# Patient Record
Sex: MALE | Race: WHITE | NOT HISPANIC OR LATINO | URBAN - METROPOLITAN AREA
[De-identification: names, ages, dates, MRNs, and addresses within clinical notes are randomized per-mention and may not be internally consistent; named-entity substitution may affect disease eponyms.]

---

## 2015-04-27 RX ORDER — LOSARTAN POTASSIUM 100 MG/1
4 TABLET, FILM COATED ORAL
Qty: 0 | Refills: 0 | COMMUNITY
Start: 2015-04-27

## 2019-04-29 ENCOUNTER — INPATIENT (INPATIENT)
Facility: HOSPITAL | Age: 74
LOS: 9 days | Discharge: NURSING, REHAB OR LONG-TERM CARE | DRG: 374 | End: 2019-05-09
Attending: HOSPITALIST | Admitting: HOSPITALIST
Payer: MEDICARE

## 2019-04-29 VITALS — HEIGHT: 70 IN | HEART RATE: 119 BPM | OXYGEN SATURATION: 95 % | WEIGHT: 190.04 LBS | RESPIRATION RATE: 16 BRPM

## 2019-04-29 DIAGNOSIS — E78.5 HYPERLIPIDEMIA, UNSPECIFIED: ICD-10-CM

## 2019-04-29 DIAGNOSIS — E86.0 DEHYDRATION: ICD-10-CM

## 2019-04-29 DIAGNOSIS — A41.9 SEPSIS, UNSPECIFIED ORGANISM: ICD-10-CM

## 2019-04-29 DIAGNOSIS — N17.9 ACUTE KIDNEY FAILURE, UNSPECIFIED: ICD-10-CM

## 2019-04-29 DIAGNOSIS — Z86.79 PERSONAL HISTORY OF OTHER DISEASES OF THE CIRCULATORY SYSTEM: ICD-10-CM

## 2019-04-29 DIAGNOSIS — E11.9 TYPE 2 DIABETES MELLITUS WITHOUT COMPLICATIONS: ICD-10-CM

## 2019-04-29 DIAGNOSIS — Z29.9 ENCOUNTER FOR PROPHYLACTIC MEASURES, UNSPECIFIED: ICD-10-CM

## 2019-04-29 DIAGNOSIS — E03.9 HYPOTHYROIDISM, UNSPECIFIED: ICD-10-CM

## 2019-04-29 DIAGNOSIS — K22.70 BARRETT'S ESOPHAGUS WITHOUT DYSPLASIA: ICD-10-CM

## 2019-04-29 LAB
ALBUMIN SERPL ELPH-MCNC: 2.5 G/DL — LOW (ref 3.5–5)
ALP SERPL-CCNC: 148 U/L — HIGH (ref 40–120)
ALT FLD-CCNC: 33 U/L DA — SIGNIFICANT CHANGE UP (ref 10–60)
ANION GAP SERPL CALC-SCNC: 10 MMOL/L — SIGNIFICANT CHANGE UP (ref 5–17)
ANION GAP SERPL CALC-SCNC: 12 MMOL/L — SIGNIFICANT CHANGE UP (ref 5–17)
ANION GAP SERPL CALC-SCNC: 19 MMOL/L — HIGH (ref 5–17)
APPEARANCE UR: CLEAR — SIGNIFICANT CHANGE UP
APTT BLD: 30.4 SEC — SIGNIFICANT CHANGE UP (ref 27.5–36.3)
AST SERPL-CCNC: 29 U/L — SIGNIFICANT CHANGE UP (ref 10–40)
BASE EXCESS BLDV CALC-SCNC: -8.5 MMOL/L — LOW (ref -2–2)
BASOPHILS # BLD AUTO: 0 K/UL — SIGNIFICANT CHANGE UP (ref 0–0.2)
BASOPHILS # BLD AUTO: 0.05 K/UL — SIGNIFICANT CHANGE UP (ref 0–0.2)
BASOPHILS # BLD AUTO: 0.07 K/UL — SIGNIFICANT CHANGE UP (ref 0–0.2)
BASOPHILS NFR BLD AUTO: 0 % — SIGNIFICANT CHANGE UP (ref 0–2)
BASOPHILS NFR BLD AUTO: 0.2 % — SIGNIFICANT CHANGE UP (ref 0–2)
BASOPHILS NFR BLD AUTO: 0.3 % — SIGNIFICANT CHANGE UP (ref 0–2)
BILIRUB SERPL-MCNC: 0.4 MG/DL — SIGNIFICANT CHANGE UP (ref 0.2–1.2)
BILIRUB UR-MCNC: NEGATIVE — SIGNIFICANT CHANGE UP
BLOOD GAS COMMENTS, VENOUS: SIGNIFICANT CHANGE UP
BUN SERPL-MCNC: 46 MG/DL — HIGH (ref 7–18)
BUN SERPL-MCNC: 49 MG/DL — HIGH (ref 7–18)
BUN SERPL-MCNC: 52 MG/DL — HIGH (ref 7–18)
BURR CELLS BLD QL SMEAR: PRESENT — SIGNIFICANT CHANGE UP
CALCIUM SERPL-MCNC: 10.9 MG/DL — HIGH (ref 8.4–10.5)
CALCIUM SERPL-MCNC: 8.8 MG/DL — SIGNIFICANT CHANGE UP (ref 8.4–10.5)
CALCIUM SERPL-MCNC: 9.6 MG/DL — SIGNIFICANT CHANGE UP (ref 8.4–10.5)
CHLORIDE SERPL-SCNC: 101 MMOL/L — SIGNIFICANT CHANGE UP (ref 96–108)
CHLORIDE SERPL-SCNC: 107 MMOL/L — SIGNIFICANT CHANGE UP (ref 96–108)
CHLORIDE SERPL-SCNC: 108 MMOL/L — SIGNIFICANT CHANGE UP (ref 96–108)
CHOLEST SERPL-MCNC: 94 MG/DL — SIGNIFICANT CHANGE UP (ref 10–199)
CO2 SERPL-SCNC: 14 MMOL/L — LOW (ref 22–31)
CO2 SERPL-SCNC: 20 MMOL/L — LOW (ref 22–31)
CO2 SERPL-SCNC: 21 MMOL/L — LOW (ref 22–31)
COLOR SPEC: YELLOW — SIGNIFICANT CHANGE UP
CREAT SERPL-MCNC: 2.26 MG/DL — HIGH (ref 0.5–1.3)
CREAT SERPL-MCNC: 2.41 MG/DL — HIGH (ref 0.5–1.3)
CREAT SERPL-MCNC: 3.03 MG/DL — HIGH (ref 0.5–1.3)
DIFF PNL FLD: ABNORMAL
EOSINOPHIL # BLD AUTO: 0 K/UL — SIGNIFICANT CHANGE UP (ref 0–0.5)
EOSINOPHIL # BLD AUTO: 0.06 K/UL — SIGNIFICANT CHANGE UP (ref 0–0.5)
EOSINOPHIL # BLD AUTO: 0.15 K/UL — SIGNIFICANT CHANGE UP (ref 0–0.5)
EOSINOPHIL NFR BLD AUTO: 0 % — SIGNIFICANT CHANGE UP (ref 0–6)
EOSINOPHIL NFR BLD AUTO: 0.2 % — SIGNIFICANT CHANGE UP (ref 0–6)
EOSINOPHIL NFR BLD AUTO: 0.6 % — SIGNIFICANT CHANGE UP (ref 0–6)
FLU A RESULT: SIGNIFICANT CHANGE UP
FLU A RESULT: SIGNIFICANT CHANGE UP
FLUAV AG NPH QL: SIGNIFICANT CHANGE UP
FLUBV AG NPH QL: SIGNIFICANT CHANGE UP
GLUCOSE SERPL-MCNC: 128 MG/DL — HIGH (ref 70–99)
GLUCOSE SERPL-MCNC: 132 MG/DL — HIGH (ref 70–99)
GLUCOSE SERPL-MCNC: 268 MG/DL — HIGH (ref 70–99)
GLUCOSE UR QL: NEGATIVE — SIGNIFICANT CHANGE UP
HCO3 BLDV-SCNC: 16 MMOL/L — LOW (ref 21–29)
HCT VFR BLD CALC: 34.8 % — LOW (ref 39–50)
HCT VFR BLD CALC: 36.9 % — LOW (ref 39–50)
HCT VFR BLD CALC: 42.7 % — SIGNIFICANT CHANGE UP (ref 39–50)
HDLC SERPL-MCNC: 35 MG/DL — LOW
HGB BLD-MCNC: 11.4 G/DL — LOW (ref 13–17)
HGB BLD-MCNC: 12.1 G/DL — LOW (ref 13–17)
HGB BLD-MCNC: 14 G/DL — SIGNIFICANT CHANGE UP (ref 13–17)
HOROWITZ INDEX BLDV+IHG-RTO: 21 — SIGNIFICANT CHANGE UP
IMM GRANULOCYTES NFR BLD AUTO: 0.5 % — SIGNIFICANT CHANGE UP (ref 0–1.5)
IMM GRANULOCYTES NFR BLD AUTO: 0.6 % — SIGNIFICANT CHANGE UP (ref 0–1.5)
INR BLD: 1.11 RATIO — SIGNIFICANT CHANGE UP (ref 0.88–1.16)
KETONES UR-MCNC: NEGATIVE — SIGNIFICANT CHANGE UP
LACTATE SERPL-SCNC: 10.3 MMOL/L — CRITICAL HIGH (ref 0.7–2)
LACTATE SERPL-SCNC: 3.2 MMOL/L — HIGH (ref 0.7–2)
LACTATE SERPL-SCNC: 3.2 MMOL/L — HIGH (ref 0.7–2)
LEUKOCYTE ESTERASE UR-ACNC: NEGATIVE — SIGNIFICANT CHANGE UP
LIPID PNL WITH DIRECT LDL SERPL: 25 MG/DL — SIGNIFICANT CHANGE UP
LYMPHOCYTES # BLD AUTO: 1.32 K/UL — SIGNIFICANT CHANGE UP (ref 1–3.3)
LYMPHOCYTES # BLD AUTO: 2.02 K/UL — SIGNIFICANT CHANGE UP (ref 1–3.3)
LYMPHOCYTES # BLD AUTO: 2.19 K/UL — SIGNIFICANT CHANGE UP (ref 1–3.3)
LYMPHOCYTES # BLD AUTO: 4.8 % — LOW (ref 13–44)
LYMPHOCYTES # BLD AUTO: 7 % — LOW (ref 13–44)
LYMPHOCYTES # BLD AUTO: 9.4 % — LOW (ref 13–44)
MAGNESIUM SERPL-MCNC: 1.9 MG/DL — SIGNIFICANT CHANGE UP (ref 1.6–2.6)
MANUAL SMEAR VERIFICATION: SIGNIFICANT CHANGE UP
MCHC RBC-ENTMCNC: 30.1 PG — SIGNIFICANT CHANGE UP (ref 27–34)
MCHC RBC-ENTMCNC: 30.2 PG — SIGNIFICANT CHANGE UP (ref 27–34)
MCHC RBC-ENTMCNC: 30.2 PG — SIGNIFICANT CHANGE UP (ref 27–34)
MCHC RBC-ENTMCNC: 32.8 GM/DL — SIGNIFICANT CHANGE UP (ref 32–36)
MCV RBC AUTO: 91.8 FL — SIGNIFICANT CHANGE UP (ref 80–100)
MCV RBC AUTO: 92 FL — SIGNIFICANT CHANGE UP (ref 80–100)
MCV RBC AUTO: 92.2 FL — SIGNIFICANT CHANGE UP (ref 80–100)
MONOCYTES # BLD AUTO: 1.63 K/UL — HIGH (ref 0–0.9)
MONOCYTES # BLD AUTO: 1.73 K/UL — HIGH (ref 0–0.9)
MONOCYTES # BLD AUTO: 2.09 K/UL — HIGH (ref 0–0.9)
MONOCYTES NFR BLD AUTO: 6 % — SIGNIFICANT CHANGE UP (ref 2–14)
MONOCYTES NFR BLD AUTO: 7 % — SIGNIFICANT CHANGE UP (ref 2–14)
MONOCYTES NFR BLD AUTO: 7.5 % — SIGNIFICANT CHANGE UP (ref 2–14)
NEUTROPHILS # BLD AUTO: 19.11 K/UL — HIGH (ref 1.8–7.4)
NEUTROPHILS # BLD AUTO: 24.07 K/UL — HIGH (ref 1.8–7.4)
NEUTROPHILS # BLD AUTO: 24.82 K/UL — HIGH (ref 1.8–7.4)
NEUTROPHILS NFR BLD AUTO: 79 % — HIGH (ref 43–77)
NEUTROPHILS NFR BLD AUTO: 82.2 % — HIGH (ref 43–77)
NEUTROPHILS NFR BLD AUTO: 86.7 % — HIGH (ref 43–77)
NEUTS BAND # BLD: 7 % — SIGNIFICANT CHANGE UP (ref 0–8)
NITRITE UR-MCNC: NEGATIVE — SIGNIFICANT CHANGE UP
NRBC # BLD: 0 /100 WBCS — SIGNIFICANT CHANGE UP (ref 0–0)
NRBC # BLD: 0 /100 WBCS — SIGNIFICANT CHANGE UP (ref 0–0)
NRBC # BLD: 0 /100 — SIGNIFICANT CHANGE UP (ref 0–0)
NT-PROBNP SERPL-SCNC: 575 PG/ML — HIGH (ref 0–125)
PCO2 BLDV: 32 MMHG — LOW (ref 35–50)
PH BLDV: 7.32 — LOW (ref 7.35–7.45)
PH UR: 5 — SIGNIFICANT CHANGE UP (ref 5–8)
PHOSPHATE SERPL-MCNC: 2.8 MG/DL — SIGNIFICANT CHANGE UP (ref 2.5–4.5)
PLAT MORPH BLD: NORMAL — SIGNIFICANT CHANGE UP
PLATELET # BLD AUTO: 304 K/UL — SIGNIFICANT CHANGE UP (ref 150–400)
PLATELET # BLD AUTO: 342 K/UL — SIGNIFICANT CHANGE UP (ref 150–400)
PLATELET # BLD AUTO: 458 K/UL — HIGH (ref 150–400)
PO2 BLDV: 29 MMHG — SIGNIFICANT CHANGE UP (ref 25–45)
POIKILOCYTOSIS BLD QL AUTO: SLIGHT — SIGNIFICANT CHANGE UP
POTASSIUM SERPL-MCNC: 3.4 MMOL/L — LOW (ref 3.5–5.3)
POTASSIUM SERPL-MCNC: 3.8 MMOL/L — SIGNIFICANT CHANGE UP (ref 3.5–5.3)
POTASSIUM SERPL-MCNC: 4.2 MMOL/L — SIGNIFICANT CHANGE UP (ref 3.5–5.3)
POTASSIUM SERPL-SCNC: 3.4 MMOL/L — LOW (ref 3.5–5.3)
POTASSIUM SERPL-SCNC: 3.8 MMOL/L — SIGNIFICANT CHANGE UP (ref 3.5–5.3)
POTASSIUM SERPL-SCNC: 4.2 MMOL/L — SIGNIFICANT CHANGE UP (ref 3.5–5.3)
PROT SERPL-MCNC: 8.2 G/DL — SIGNIFICANT CHANGE UP (ref 6–8.3)
PROT UR-MCNC: 100
PROTHROM AB SERPL-ACNC: 12.4 SEC — SIGNIFICANT CHANGE UP (ref 10–12.9)
RBC # BLD: 3.79 M/UL — LOW (ref 4.2–5.8)
RBC # BLD: 4.01 M/UL — LOW (ref 4.2–5.8)
RBC # BLD: 4.63 M/UL — SIGNIFICANT CHANGE UP (ref 4.2–5.8)
RBC # FLD: 13 % — SIGNIFICANT CHANGE UP (ref 10.3–14.5)
RBC # FLD: 13.1 % — SIGNIFICANT CHANGE UP (ref 10.3–14.5)
RBC # FLD: 13.2 % — SIGNIFICANT CHANGE UP (ref 10.3–14.5)
RBC BLD AUTO: ABNORMAL
RSV RESULT: SIGNIFICANT CHANGE UP
RSV RNA RESP QL NAA+PROBE: SIGNIFICANT CHANGE UP
SAO2 % BLDV: 38 % — LOW (ref 67–88)
SODIUM SERPL-SCNC: 134 MMOL/L — LOW (ref 135–145)
SODIUM SERPL-SCNC: 139 MMOL/L — SIGNIFICANT CHANGE UP (ref 135–145)
SODIUM SERPL-SCNC: 139 MMOL/L — SIGNIFICANT CHANGE UP (ref 135–145)
SP GR SPEC: 1.01 — SIGNIFICANT CHANGE UP (ref 1.01–1.02)
TOTAL CHOLESTEROL/HDL RATIO MEASUREMENT: 2.7 RATIO — LOW (ref 3.4–9.6)
TRIGL SERPL-MCNC: 171 MG/DL — HIGH (ref 10–149)
UROBILINOGEN FLD QL: NEGATIVE — SIGNIFICANT CHANGE UP
VARIANT LYMPHS # BLD: 1 % — SIGNIFICANT CHANGE UP (ref 0–6)
WBC # BLD: 23.27 K/UL — HIGH (ref 3.8–10.5)
WBC # BLD: 27.77 K/UL — HIGH (ref 3.8–10.5)
WBC # BLD: 28.86 K/UL — HIGH (ref 3.8–10.5)
WBC # FLD AUTO: 23.27 K/UL — HIGH (ref 3.8–10.5)
WBC # FLD AUTO: 27.77 K/UL — HIGH (ref 3.8–10.5)
WBC # FLD AUTO: 28.86 K/UL — HIGH (ref 3.8–10.5)

## 2019-04-29 PROCEDURE — 93010 ELECTROCARDIOGRAM REPORT: CPT

## 2019-04-29 PROCEDURE — 71045 X-RAY EXAM CHEST 1 VIEW: CPT | Mod: 26

## 2019-04-29 PROCEDURE — 99285 EMERGENCY DEPT VISIT HI MDM: CPT

## 2019-04-29 RX ORDER — SODIUM CHLORIDE 9 MG/ML
1000 INJECTION, SOLUTION INTRAVENOUS ONCE
Qty: 0 | Refills: 0 | Status: COMPLETED | OUTPATIENT
Start: 2019-04-29 | End: 2019-04-29

## 2019-04-29 RX ORDER — TIOTROPIUM BROMIDE 18 UG/1
1 CAPSULE ORAL; RESPIRATORY (INHALATION) DAILY
Qty: 0 | Refills: 0 | Status: COMPLETED | OUTPATIENT
Start: 2019-04-29 | End: 2020-03-27

## 2019-04-29 RX ORDER — INFLUENZA VIRUS VACCINE 15; 15; 15; 15 UG/.5ML; UG/.5ML; UG/.5ML; UG/.5ML
0.5 SUSPENSION INTRAMUSCULAR ONCE
Qty: 0 | Refills: 0 | Status: COMPLETED | OUTPATIENT
Start: 2019-04-29 | End: 2019-04-29

## 2019-04-29 RX ORDER — ATORVASTATIN CALCIUM 80 MG/1
1 TABLET, FILM COATED ORAL
Qty: 0 | Refills: 0 | COMMUNITY

## 2019-04-29 RX ORDER — OLANZAPINE 15 MG/1
5 TABLET, FILM COATED ORAL DAILY
Qty: 0 | Refills: 0 | Status: DISCONTINUED | OUTPATIENT
Start: 2019-04-29 | End: 2019-05-09

## 2019-04-29 RX ORDER — DEXTROSE 50 % IN WATER 50 %
15 SYRINGE (ML) INTRAVENOUS ONCE
Qty: 0 | Refills: 0 | Status: DISCONTINUED | OUTPATIENT
Start: 2019-04-29 | End: 2019-05-06

## 2019-04-29 RX ORDER — HEPARIN SODIUM 5000 [USP'U]/ML
5000 INJECTION INTRAVENOUS; SUBCUTANEOUS EVERY 8 HOURS
Qty: 0 | Refills: 0 | Status: DISCONTINUED | OUTPATIENT
Start: 2019-04-29 | End: 2019-04-29

## 2019-04-29 RX ORDER — SODIUM CHLORIDE 9 MG/ML
2000 INJECTION INTRAMUSCULAR; INTRAVENOUS; SUBCUTANEOUS ONCE
Qty: 0 | Refills: 0 | Status: COMPLETED | OUTPATIENT
Start: 2019-04-29 | End: 2019-04-29

## 2019-04-29 RX ORDER — SODIUM CHLORIDE 9 MG/ML
1000 INJECTION INTRAMUSCULAR; INTRAVENOUS; SUBCUTANEOUS
Qty: 0 | Refills: 0 | Status: DISCONTINUED | OUTPATIENT
Start: 2019-04-29 | End: 2019-04-30

## 2019-04-29 RX ORDER — AMLODIPINE BESYLATE 2.5 MG/1
1 TABLET ORAL
Qty: 0 | Refills: 0 | COMMUNITY

## 2019-04-29 RX ORDER — DEXTROSE 50 % IN WATER 50 %
12.5 SYRINGE (ML) INTRAVENOUS ONCE
Qty: 0 | Refills: 0 | Status: DISCONTINUED | OUTPATIENT
Start: 2019-04-29 | End: 2019-05-06

## 2019-04-29 RX ORDER — LEVOTHYROXINE SODIUM 125 MCG
1 TABLET ORAL
Qty: 0 | Refills: 0 | COMMUNITY

## 2019-04-29 RX ORDER — LEVOTHYROXINE SODIUM 125 MCG
75 TABLET ORAL DAILY
Qty: 0 | Refills: 0 | Status: DISCONTINUED | OUTPATIENT
Start: 2019-04-29 | End: 2019-05-09

## 2019-04-29 RX ORDER — IPRATROPIUM/ALBUTEROL SULFATE 18-103MCG
3 AEROSOL WITH ADAPTER (GRAM) INHALATION ONCE
Qty: 0 | Refills: 0 | Status: COMPLETED | OUTPATIENT
Start: 2019-04-29 | End: 2019-04-29

## 2019-04-29 RX ORDER — OLANZAPINE 15 MG/1
1 TABLET, FILM COATED ORAL
Qty: 0 | Refills: 0 | COMMUNITY

## 2019-04-29 RX ORDER — CHLORHEXIDINE GLUCONATE 213 G/1000ML
1 SOLUTION TOPICAL EVERY 12 HOURS
Qty: 0 | Refills: 0 | Status: DISCONTINUED | OUTPATIENT
Start: 2019-04-29 | End: 2019-05-03

## 2019-04-29 RX ORDER — SITAGLIPTIN 50 MG/1
1 TABLET, FILM COATED ORAL
Qty: 0 | Refills: 0 | COMMUNITY

## 2019-04-29 RX ORDER — GLUCAGON INJECTION, SOLUTION 0.5 MG/.1ML
1 INJECTION, SOLUTION SUBCUTANEOUS ONCE
Qty: 0 | Refills: 0 | Status: DISCONTINUED | OUTPATIENT
Start: 2019-04-29 | End: 2019-05-09

## 2019-04-29 RX ORDER — SODIUM CHLORIDE 9 MG/ML
1000 INJECTION, SOLUTION INTRAVENOUS
Qty: 0 | Refills: 0 | Status: DISCONTINUED | OUTPATIENT
Start: 2019-04-29 | End: 2019-05-09

## 2019-04-29 RX ORDER — CEFTRIAXONE 500 MG/1
1 INJECTION, POWDER, FOR SOLUTION INTRAMUSCULAR; INTRAVENOUS EVERY 24 HOURS
Qty: 0 | Refills: 0 | Status: DISCONTINUED | OUTPATIENT
Start: 2019-04-29 | End: 2019-05-02

## 2019-04-29 RX ORDER — DEXTROSE 50 % IN WATER 50 %
25 SYRINGE (ML) INTRAVENOUS ONCE
Qty: 0 | Refills: 0 | Status: DISCONTINUED | OUTPATIENT
Start: 2019-04-29 | End: 2019-05-06

## 2019-04-29 RX ORDER — SODIUM CHLORIDE 9 MG/ML
2700 INJECTION INTRAMUSCULAR; INTRAVENOUS; SUBCUTANEOUS ONCE
Qty: 0 | Refills: 0 | Status: COMPLETED | OUTPATIENT
Start: 2019-04-29 | End: 2019-04-29

## 2019-04-29 RX ORDER — IPRATROPIUM/ALBUTEROL SULFATE 18-103MCG
3 AEROSOL WITH ADAPTER (GRAM) INHALATION EVERY 6 HOURS
Qty: 0 | Refills: 0 | Status: DISCONTINUED | OUTPATIENT
Start: 2019-04-29 | End: 2019-05-07

## 2019-04-29 RX ORDER — VANCOMYCIN HCL 1 G
1000 VIAL (EA) INTRAVENOUS ONCE
Qty: 0 | Refills: 0 | Status: COMPLETED | OUTPATIENT
Start: 2019-04-29 | End: 2019-04-29

## 2019-04-29 RX ORDER — AZTREONAM 2 G
1000 VIAL (EA) INJECTION ONCE
Qty: 0 | Refills: 0 | Status: COMPLETED | OUTPATIENT
Start: 2019-04-29 | End: 2019-04-29

## 2019-04-29 RX ORDER — HEPARIN SODIUM 5000 [USP'U]/ML
5000 INJECTION INTRAVENOUS; SUBCUTANEOUS EVERY 12 HOURS
Qty: 0 | Refills: 0 | Status: DISCONTINUED | OUTPATIENT
Start: 2019-04-29 | End: 2019-05-09

## 2019-04-29 RX ORDER — AZITHROMYCIN 500 MG/1
500 TABLET, FILM COATED ORAL EVERY 24 HOURS
Qty: 0 | Refills: 0 | Status: DISCONTINUED | OUTPATIENT
Start: 2019-04-29 | End: 2019-05-02

## 2019-04-29 RX ORDER — INSULIN LISPRO 100/ML
VIAL (ML) SUBCUTANEOUS
Qty: 0 | Refills: 0 | Status: DISCONTINUED | OUTPATIENT
Start: 2019-04-29 | End: 2019-05-09

## 2019-04-29 RX ORDER — ATORVASTATIN CALCIUM 80 MG/1
40 TABLET, FILM COATED ORAL AT BEDTIME
Qty: 0 | Refills: 0 | Status: DISCONTINUED | OUTPATIENT
Start: 2019-04-29 | End: 2019-05-09

## 2019-04-29 RX ORDER — ALBUTEROL 90 UG/1
1 AEROSOL, METERED ORAL EVERY 4 HOURS
Qty: 0 | Refills: 0 | Status: DISCONTINUED | OUTPATIENT
Start: 2019-04-29 | End: 2019-04-29

## 2019-04-29 RX ADMIN — Medication 250 MILLIGRAM(S): at 15:45

## 2019-04-29 RX ADMIN — SODIUM CHLORIDE 2700 MILLILITER(S): 9 INJECTION INTRAMUSCULAR; INTRAVENOUS; SUBCUTANEOUS at 14:00

## 2019-04-29 RX ADMIN — Medication 1: at 17:41

## 2019-04-29 RX ADMIN — ATORVASTATIN CALCIUM 40 MILLIGRAM(S): 80 TABLET, FILM COATED ORAL at 21:57

## 2019-04-29 RX ADMIN — SODIUM CHLORIDE 125 MILLILITER(S): 9 INJECTION INTRAMUSCULAR; INTRAVENOUS; SUBCUTANEOUS at 16:00

## 2019-04-29 RX ADMIN — Medication 3 MILLILITER(S): at 15:44

## 2019-04-29 RX ADMIN — SODIUM CHLORIDE 4000 MILLILITER(S): 9 INJECTION INTRAMUSCULAR; INTRAVENOUS; SUBCUTANEOUS at 16:01

## 2019-04-29 RX ADMIN — Medication 50 MILLIGRAM(S): at 14:09

## 2019-04-29 RX ADMIN — CHLORHEXIDINE GLUCONATE 1 APPLICATION(S): 213 SOLUTION TOPICAL at 21:56

## 2019-04-29 RX ADMIN — SODIUM CHLORIDE 125 MILLILITER(S): 9 INJECTION INTRAMUSCULAR; INTRAVENOUS; SUBCUTANEOUS at 15:59

## 2019-04-29 RX ADMIN — HEPARIN SODIUM 5000 UNIT(S): 5000 INJECTION INTRAVENOUS; SUBCUTANEOUS at 17:41

## 2019-04-29 RX ADMIN — OLANZAPINE 5 MILLIGRAM(S): 15 TABLET, FILM COATED ORAL at 21:56

## 2019-04-29 RX ADMIN — AZITHROMYCIN 250 MILLIGRAM(S): 500 TABLET, FILM COATED ORAL at 17:41

## 2019-04-29 RX ADMIN — Medication 3 MILLILITER(S): at 20:22

## 2019-04-29 RX ADMIN — Medication 1000 MILLIGRAM(S): at 15:45

## 2019-04-29 RX ADMIN — SODIUM CHLORIDE 2700 MILLILITER(S): 9 INJECTION INTRAMUSCULAR; INTRAVENOUS; SUBCUTANEOUS at 12:56

## 2019-04-29 RX ADMIN — CEFTRIAXONE 100 GRAM(S): 500 INJECTION, POWDER, FOR SOLUTION INTRAMUSCULAR; INTRAVENOUS at 21:56

## 2019-04-29 RX ADMIN — SODIUM CHLORIDE 1000 MILLILITER(S): 9 INJECTION, SOLUTION INTRAVENOUS at 21:56

## 2019-04-29 NOTE — H&P ADULT - PROBLEM SELECTOR PLAN 1
T- 99, HR- 101, BP- 105/49, lactate- 10.3, leukocytosis- 28  Sepsis likely 2/2 to bronchitis?  UA pending  EKG- sinus tachy  CXR- no consolidation seen, but patient is very congested and has cough  s/p 2700 ml NS bolus + aztronam and vanc x 1 in ED  - f/u blood cx  - f/u UA and urine cx  - WIll start CTX and azithromycin prophylactically  - duoneb q6hr  - f/u procalcitonin  - trend lactate q4hr  - f/u serum strep pneumo  - f/u urine legionella  - f/u flu swab

## 2019-04-29 NOTE — H&P ADULT - PROBLEM SELECTOR PLAN 2
likely prerenal 2/2 to hypovolemia vs drug induced (on losartan, HCTz, and metformin ER)  - hold off to the medications  - c/w IVF  - Monitor closely

## 2019-04-29 NOTE — H&P ADULT - HISTORY OF PRESENT ILLNESS
73 y/o M with PMHx of brown's esophagus, HTN, DM, HLD, and hypothyroidism came in due to weakness. Patient states that he always had difficulty swallowing solids but since 1 week it has been getting worse. He has not been eating or drinking much since then and therefore feels extremely weak. Also states having cough productive of orange sputum since 2-3 days. No fever. No sick contacts. No SOB. Today, he went to his PMD for regular check up and was sent to ER because he "looked sick". He states feeling better now but is still very thirsty. Denies any other complaints including abdominal pain, nausea, vomiting, dysuria, hematuria, or changes in urine output.    SH: lives with this wife. Non smoker. Non alcoholic.

## 2019-04-29 NOTE — H&P ADULT - ASSESSMENT
73 y/o M with PMHx of brown's esophagus, HTN, DM, HLD, and hypothyroidism came in due to weakness. Patient states that he always had difficulty swallowing solids but since 1 week it has been getting worse. He has not been eating or drinking much since then and therefore feels extremely weak. Also states having cough productive of orange sputum since 2-3 days. No fever. No sick contacts. No SOB. Today, he went to his PMD for regular check up and was sent to ER because he "looked sick". He states feeling better now but is still very thirsty. Denies any other complaints including abdominal pain, nausea, vomiting, dysuria, hematuria, or changes in urine output. ICU consulted for sepsis.

## 2019-04-29 NOTE — ED PROVIDER NOTE - OBJECTIVE STATEMENT
75 y/o M with a PMHx of Mcneill esophagus, Diabetes, Hypertension , HLD, Hypokalemia and no PSHx presents to ED c/o wife went on vacation, unable to take care of himself. Reports has not eaten and has been experiencing weakness/diarrhea. Endorses mild depression. Denies cough, although presents with cough. Drug Allergy: Penicillin.

## 2019-04-29 NOTE — H&P ADULT - PROBLEM SELECTOR PLAN 9
[] Previous VTE                                                3  [] Thrombophilia                                             2  [] Lower limb paralysis                                   2    [] Current Cancer                                             2   [X] Immobilization > 24 hrs                              1  [X] ICU/CCU stay > 24 hours                             1  [X] Age > 60                                                         1    IMPROVE VTE Score: 3  heparin

## 2019-04-29 NOTE — H&P ADULT - PROBLEM SELECTOR PLAN 5
On losartan, HCTz, and amlodipine at home   - Will hold off all antihypertensives due to borderline BP. Restart as needed

## 2019-04-29 NOTE — H&P ADULT - PROBLEM SELECTOR PLAN 3
Patient very dehydrated on exam, likely 2/2 to decreased oral intake  s/p 2700 ml of NS  - Will give additional 2L NS bolus followed by maintenance fluids  - trend lactate  - Monitor urine output

## 2019-04-29 NOTE — ED ADULT NURSE REASSESSMENT NOTE - NS ED NURSE REASSESS COMMENT FT1
Pt is coughing at times, states feeling well. Iv fluid infusing. NSR, vitals stable, afebrile. Pt transferred to ICU.

## 2019-04-29 NOTE — H&P ADULT - NSICDXPASTMEDICALHX_GEN_ALL_CORE_FT
PAST MEDICAL HISTORY:  Mcneill esophagus     Diabetes     History of hypertension     HLD (hyperlipidemia)     Hypokalemia

## 2019-04-29 NOTE — H&P ADULT - ATTENDING COMMENTS
Patient seen and examined with resident, Addendum to above.    73 y/o M with PMHx of brown's esophagus, HTN, DM, HLD, and hypothyroidism presenting with generalized malaise and poor oral intake. Does not endorse any specific complaints except for poor oral intake. No nausea, vomiting, or diarrhea. No abdominal pain. On exam appears dehydrated with poor skin turgor. In the ED found to have lactic acidosis, tachycardia and leukocytosis, ruling out infection/sepsis.     A provider-focused exam of reperfusion assessment was completed after fluid bolus.     T(C): 36.4 (04-29-19 @ 15:54), Max: 37.6 (04-29-19 @ 11:44)  HR: 83 (04-29-19 @ 15:54) (83 - 119)  BP: 113/53 (04-29-19 @ 15:54) (105/49 - 113/53)  RR: 17 (04-29-19 @ 15:54) (16 - 17)  SpO2: 94% (04-29-19 @ 15:54) (94% - 96%)    Resp: clear to auscultation, no rales, wheezing, or rhonchi  Cardiac: S1, S2, no murmurs appreciated  Capillary refill time is less than 2 seconds.   Peripheral pulses are + 2 Radial bilaterally and + 2 DP bilaterally  Skin: normal turgor    Assessment:  1. Sepsis - suspect infection though no obvious source, ?Bronchitis. Will cover empirically with antibiotics. F/u cultures. UA pending. CXR unremarkable. R/o FLU.   2. Acute renal failure - unclear baseline. Appears severely dehydrated, possibly ATN due to sepsis. Monitor urine output. Check urine studies including electrolytes and UA. Check US of the kidneys  3. Lactic acidosis - possibly due to dehydration. Vs. due to metformin toxicity as patient with acute renal failure. Trend lactate, if not improving may need HD.   4. Diabetes - monitor fingerstick coverage with insulin.   5. Hypothyroidism - cont. Levothyroxine    - Admit patient to ICU.   - DVT prophylaxis.

## 2019-04-29 NOTE — H&P ADULT - NSHPREVIEWOFSYSTEMS_GEN_ALL_CORE
REVIEW OF SYSTEMS:  CONSTITUTIONAL: fatigue  EYES: No eye pain, visual disturbances, or discharge  ENMT:  No difficulty hearing, tinnitus, vertigo; No sinus or throat pain  NECK: No pain or stiffness  BREASTS: No pain, masses, or nipple discharge  RESPIRATORY: cough, No shortness of breath  CARDIOVASCULAR: No chest pain, palpitations, dizziness, or leg swelling  GASTROINTESTINAL: No abdominal or epigastric pain. No nausea, vomiting, or hematemesis; No diarrhea or constipation. No melena or hematochezia.  GENITOURINARY: No dysuria, frequency, hematuria, or incontinence  NEUROLOGICAL: No headaches, memory loss, loss of strength, numbness, or tremors  SKIN: No itching, burning, rashes, or lesions   LYMPH NODES: No enlarged glands  ENDOCRINE: No heat or cold intolerance; No hair loss  MUSCULOSKELETAL: No joint pain or swelling; No muscle, back, or extremity pain REVIEW OF SYSTEMS:  CONSTITUTIONAL: fatigue, generalized weakness   EYES: No eye pain, visual disturbances, or discharge  ENMT:  No difficulty hearing, tinnitus, vertigo; No sinus or throat pain  NECK: No pain or stiffness  BREASTS: No pain, masses, or nipple discharge  RESPIRATORY: cough, No shortness of breath  CARDIOVASCULAR: No chest pain, palpitations, dizziness, or leg swelling  GASTROINTESTINAL: No abdominal or epigastric pain. No nausea, vomiting, or hematemesis; No diarrhea or constipation. No melena or hematochezia.  GENITOURINARY: No dysuria, frequency, hematuria, or incontinence  NEUROLOGICAL: No headaches, memory loss, loss of strength, numbness, or tremors  SKIN: No itching, burning, rashes, or lesions   LYMPH NODES: No enlarged glands  ENDOCRINE: No heat or cold intolerance; No hair loss  MUSCULOSKELETAL: No joint pain or swelling; No muscle, back, or extremity pain

## 2019-04-29 NOTE — H&P ADULT - PROBLEM SELECTOR PLAN 8
dx 3 years ago via EGD  worsening difficulty to swallowing solids  - Start puree diet until speech swallow eval   - GI- Dr. Saldana

## 2019-04-29 NOTE — ED ADULT NURSE NOTE - OBJECTIVE STATEMENT
Pt came in after having uncontrolled diarrhea at the doctor's office. Pt states not having an appetite for the past month and feeling depressed because his spouse is away.

## 2019-04-29 NOTE — PATIENT PROFILE ADULT - BRADEN SCORE
Faxed completed [employer] form for coverage for Herceptin Fax: [phone] and to Ese Ballard Fax: [phone] as requested to follow progress of [employer] approving Herceptin. [employer] application given to Nupur Garcia to continue providing meals to patient. Shobha Alanis RN, BSN     16

## 2019-04-29 NOTE — H&P ADULT - NSHPPHYSICALEXAM_GEN_ALL_CORE
Vital Signs Last 24 Hrs  T(C): 36.4 (29 Apr 2019 15:54), Max: 37.6 (29 Apr 2019 11:44)  T(F): 97.5 (29 Apr 2019 15:54), Max: 99.6 (29 Apr 2019 11:44)  HR: 83 (29 Apr 2019 15:54) (83 - 119)  BP: 113/53 (29 Apr 2019 15:54) (105/49 - 113/53)  BP(mean): --  RR: 17 (29 Apr 2019 15:54) (16 - 17)  SpO2: 94% (29 Apr 2019 15:54) (94% - 96%)    GENERAL: NAD  HEAD:  Atraumatic, Normocephalic  EYES: EOMI, PERRLA, conjunctiva and sclera clear  ENMT: dry  mucous membranes  NECK: Supple  NERVOUS SYSTEM:  Alert & Oriented X3  CHEST/LUNG: very congested, no rales, rhonchi or wheezing  HEART: Regular rate and rhythm; No murmurs, rubs, or gallops  ABDOMEN: Soft, Nontender, Nondistended; Bowel sounds present  EXTREMITIES:  2+ Peripheral Pulses, No clubbing, cyanosis, or edema  LYMPH: No lymphadenopathy noted  SKIN: dry skin

## 2019-04-30 DIAGNOSIS — R33.9 RETENTION OF URINE, UNSPECIFIED: ICD-10-CM

## 2019-04-30 LAB
ANION GAP SERPL CALC-SCNC: 10 MMOL/L — SIGNIFICANT CHANGE UP (ref 5–17)
ANION GAP SERPL CALC-SCNC: 8 MMOL/L — SIGNIFICANT CHANGE UP (ref 5–17)
ANION GAP SERPL CALC-SCNC: 8 MMOL/L — SIGNIFICANT CHANGE UP (ref 5–17)
BASOPHILS # BLD AUTO: 0.04 K/UL — SIGNIFICANT CHANGE UP (ref 0–0.2)
BASOPHILS NFR BLD AUTO: 0.2 % — SIGNIFICANT CHANGE UP (ref 0–2)
BUN SERPL-MCNC: 25 MG/DL — HIGH (ref 7–18)
BUN SERPL-MCNC: 30 MG/DL — HIGH (ref 7–18)
BUN SERPL-MCNC: 38 MG/DL — HIGH (ref 7–18)
CALCIUM SERPL-MCNC: 8.7 MG/DL — SIGNIFICANT CHANGE UP (ref 8.4–10.5)
CALCIUM SERPL-MCNC: 8.9 MG/DL — SIGNIFICANT CHANGE UP (ref 8.4–10.5)
CALCIUM SERPL-MCNC: 9 MG/DL — SIGNIFICANT CHANGE UP (ref 8.4–10.5)
CALCIUM UR-MCNC: 5.1 MG/DL — SIGNIFICANT CHANGE UP
CHLORIDE SERPL-SCNC: 109 MMOL/L — HIGH (ref 96–108)
CHLORIDE SERPL-SCNC: 111 MMOL/L — HIGH (ref 96–108)
CHLORIDE SERPL-SCNC: 112 MMOL/L — HIGH (ref 96–108)
CHLORIDE UR-SCNC: 126 MMOL/L — HIGH (ref 55–125)
CO2 SERPL-SCNC: 18 MMOL/L — LOW (ref 22–31)
CO2 SERPL-SCNC: 19 MMOL/L — LOW (ref 22–31)
CO2 SERPL-SCNC: 20 MMOL/L — LOW (ref 22–31)
CREAT ?TM UR-MCNC: 56 MG/DL — SIGNIFICANT CHANGE UP
CREAT SERPL-MCNC: 1.61 MG/DL — HIGH (ref 0.5–1.3)
CREAT SERPL-MCNC: 1.71 MG/DL — HIGH (ref 0.5–1.3)
CREAT SERPL-MCNC: 2.04 MG/DL — HIGH (ref 0.5–1.3)
EOSINOPHIL # BLD AUTO: 0.23 K/UL — SIGNIFICANT CHANGE UP (ref 0–0.5)
EOSINOPHIL NFR BLD AUTO: 1.2 % — SIGNIFICANT CHANGE UP (ref 0–6)
GLUCOSE SERPL-MCNC: 145 MG/DL — HIGH (ref 70–99)
GLUCOSE SERPL-MCNC: 153 MG/DL — HIGH (ref 70–99)
GLUCOSE SERPL-MCNC: 159 MG/DL — HIGH (ref 70–99)
HBA1C BLD-MCNC: 7.2 % — HIGH (ref 4–5.6)
HCT VFR BLD CALC: 34.7 % — LOW (ref 39–50)
HGB BLD-MCNC: 11.2 G/DL — LOW (ref 13–17)
IMM GRANULOCYTES NFR BLD AUTO: 0.8 % — SIGNIFICANT CHANGE UP (ref 0–1.5)
LACTATE SERPL-SCNC: 1.6 MMOL/L — SIGNIFICANT CHANGE UP (ref 0.7–2)
LACTATE SERPL-SCNC: 2.6 MMOL/L — HIGH (ref 0.7–2)
LEGIONELLA AG UR QL: NEGATIVE — SIGNIFICANT CHANGE UP
LYMPHOCYTES # BLD AUTO: 1.98 K/UL — SIGNIFICANT CHANGE UP (ref 1–3.3)
LYMPHOCYTES # BLD AUTO: 10.6 % — LOW (ref 13–44)
MAGNESIUM SERPL-MCNC: 1.5 MG/DL — LOW (ref 1.6–2.6)
MAGNESIUM SERPL-MCNC: 1.6 MG/DL — SIGNIFICANT CHANGE UP (ref 1.6–2.6)
MCHC RBC-ENTMCNC: 29.8 PG — SIGNIFICANT CHANGE UP (ref 27–34)
MCHC RBC-ENTMCNC: 32.3 GM/DL — SIGNIFICANT CHANGE UP (ref 32–36)
MCV RBC AUTO: 92.3 FL — SIGNIFICANT CHANGE UP (ref 80–100)
MONOCYTES # BLD AUTO: 1.44 K/UL — HIGH (ref 0–0.9)
MONOCYTES NFR BLD AUTO: 7.7 % — SIGNIFICANT CHANGE UP (ref 2–14)
NEUTROPHILS # BLD AUTO: 14.84 K/UL — HIGH (ref 1.8–7.4)
NEUTROPHILS NFR BLD AUTO: 79.5 % — HIGH (ref 43–77)
NRBC # BLD: 0 /100 WBCS — SIGNIFICANT CHANGE UP (ref 0–0)
PHOSPHATE SERPL-MCNC: 1.8 MG/DL — LOW (ref 2.5–4.5)
PHOSPHATE SERPL-MCNC: 1.8 MG/DL — LOW (ref 2.5–4.5)
PLATELET # BLD AUTO: 318 K/UL — SIGNIFICANT CHANGE UP (ref 150–400)
POTASSIUM SERPL-MCNC: 3.1 MMOL/L — LOW (ref 3.5–5.3)
POTASSIUM SERPL-MCNC: 3.5 MMOL/L — SIGNIFICANT CHANGE UP (ref 3.5–5.3)
POTASSIUM SERPL-MCNC: 3.5 MMOL/L — SIGNIFICANT CHANGE UP (ref 3.5–5.3)
POTASSIUM SERPL-SCNC: 3.1 MMOL/L — LOW (ref 3.5–5.3)
POTASSIUM SERPL-SCNC: 3.5 MMOL/L — SIGNIFICANT CHANGE UP (ref 3.5–5.3)
POTASSIUM SERPL-SCNC: 3.5 MMOL/L — SIGNIFICANT CHANGE UP (ref 3.5–5.3)
PROCALCITONIN SERPL-MCNC: 0.69 NG/ML — HIGH (ref 0.02–0.1)
RBC # BLD: 3.76 M/UL — LOW (ref 4.2–5.8)
RBC # FLD: 13.1 % — SIGNIFICANT CHANGE UP (ref 10.3–14.5)
SODIUM SERPL-SCNC: 137 MMOL/L — SIGNIFICANT CHANGE UP (ref 135–145)
SODIUM SERPL-SCNC: 139 MMOL/L — SIGNIFICANT CHANGE UP (ref 135–145)
SODIUM SERPL-SCNC: 139 MMOL/L — SIGNIFICANT CHANGE UP (ref 135–145)
SODIUM UR-SCNC: 104 MMOL/L — SIGNIFICANT CHANGE UP (ref 40–220)
WBC # BLD: 18.68 K/UL — HIGH (ref 3.8–10.5)
WBC # FLD AUTO: 18.68 K/UL — HIGH (ref 3.8–10.5)

## 2019-04-30 PROCEDURE — 99223 1ST HOSP IP/OBS HIGH 75: CPT

## 2019-04-30 RX ORDER — POTASSIUM CHLORIDE 20 MEQ
40 PACKET (EA) ORAL ONCE
Qty: 0 | Refills: 0 | Status: COMPLETED | OUTPATIENT
Start: 2019-04-30 | End: 2019-04-30

## 2019-04-30 RX ORDER — POTASSIUM PHOSPHATE, MONOBASIC POTASSIUM PHOSPHATE, DIBASIC 236; 224 MG/ML; MG/ML
15 INJECTION, SOLUTION INTRAVENOUS ONCE
Qty: 0 | Refills: 0 | Status: COMPLETED | OUTPATIENT
Start: 2019-04-30 | End: 2019-04-30

## 2019-04-30 RX ORDER — TAMSULOSIN HYDROCHLORIDE 0.4 MG/1
0.4 CAPSULE ORAL AT BEDTIME
Qty: 0 | Refills: 0 | Status: DISCONTINUED | OUTPATIENT
Start: 2019-04-30 | End: 2019-05-01

## 2019-04-30 RX ORDER — HALOPERIDOL DECANOATE 100 MG/ML
0.5 INJECTION INTRAMUSCULAR ONCE
Qty: 0 | Refills: 0 | Status: COMPLETED | OUTPATIENT
Start: 2019-04-30 | End: 2019-04-30

## 2019-04-30 RX ORDER — SODIUM CHLORIDE 9 MG/ML
1000 INJECTION, SOLUTION INTRAVENOUS
Qty: 0 | Refills: 0 | Status: DISCONTINUED | OUTPATIENT
Start: 2019-04-30 | End: 2019-05-01

## 2019-04-30 RX ORDER — MAGNESIUM SULFATE 500 MG/ML
1 VIAL (ML) INJECTION ONCE
Qty: 0 | Refills: 0 | Status: COMPLETED | OUTPATIENT
Start: 2019-04-30 | End: 2019-04-30

## 2019-04-30 RX ADMIN — ATORVASTATIN CALCIUM 40 MILLIGRAM(S): 80 TABLET, FILM COATED ORAL at 22:14

## 2019-04-30 RX ADMIN — HALOPERIDOL DECANOATE 0.5 MILLIGRAM(S): 100 INJECTION INTRAMUSCULAR at 22:14

## 2019-04-30 RX ADMIN — Medication 40 MILLIEQUIVALENT(S): at 08:08

## 2019-04-30 RX ADMIN — POTASSIUM PHOSPHATE, MONOBASIC POTASSIUM PHOSPHATE, DIBASIC 62.5 MILLIMOLE(S): 236; 224 INJECTION, SOLUTION INTRAVENOUS at 08:53

## 2019-04-30 RX ADMIN — Medication 3 MILLILITER(S): at 08:08

## 2019-04-30 RX ADMIN — AZITHROMYCIN 250 MILLIGRAM(S): 500 TABLET, FILM COATED ORAL at 17:07

## 2019-04-30 RX ADMIN — OLANZAPINE 5 MILLIGRAM(S): 15 TABLET, FILM COATED ORAL at 11:57

## 2019-04-30 RX ADMIN — Medication 75 MICROGRAM(S): at 06:22

## 2019-04-30 RX ADMIN — CHLORHEXIDINE GLUCONATE 1 APPLICATION(S): 213 SOLUTION TOPICAL at 06:21

## 2019-04-30 RX ADMIN — Medication 100 MILLIGRAM(S): at 17:07

## 2019-04-30 RX ADMIN — Medication 3 MILLILITER(S): at 03:14

## 2019-04-30 RX ADMIN — Medication 100 GRAM(S): at 08:08

## 2019-04-30 RX ADMIN — SODIUM CHLORIDE 75 MILLILITER(S): 9 INJECTION, SOLUTION INTRAVENOUS at 08:11

## 2019-04-30 RX ADMIN — TAMSULOSIN HYDROCHLORIDE 0.4 MILLIGRAM(S): 0.4 CAPSULE ORAL at 22:14

## 2019-04-30 RX ADMIN — POTASSIUM PHOSPHATE, MONOBASIC POTASSIUM PHOSPHATE, DIBASIC 62.5 MILLIMOLE(S): 236; 224 INJECTION, SOLUTION INTRAVENOUS at 18:12

## 2019-04-30 RX ADMIN — Medication 1: at 11:31

## 2019-04-30 RX ADMIN — HEPARIN SODIUM 5000 UNIT(S): 5000 INJECTION INTRAVENOUS; SUBCUTANEOUS at 17:08

## 2019-04-30 RX ADMIN — Medication 2 MILLIGRAM(S): at 05:00

## 2019-04-30 RX ADMIN — HEPARIN SODIUM 5000 UNIT(S): 5000 INJECTION INTRAVENOUS; SUBCUTANEOUS at 06:22

## 2019-04-30 RX ADMIN — CEFTRIAXONE 100 GRAM(S): 500 INJECTION, POWDER, FOR SOLUTION INTRAMUSCULAR; INTRAVENOUS at 22:14

## 2019-04-30 RX ADMIN — Medication 3 MILLILITER(S): at 14:58

## 2019-04-30 RX ADMIN — CHLORHEXIDINE GLUCONATE 1 APPLICATION(S): 213 SOLUTION TOPICAL at 17:08

## 2019-04-30 NOTE — SWALLOW BEDSIDE ASSESSMENT ADULT - MODE OF PRESENTATION
cup/spoon/fed by clinician/pt required max assist/straw pt exhibited difficulty self-feeding/spoon/fed by clinician spoon/self fed/pt exhibited difficulty self-feeding

## 2019-04-30 NOTE — SWALLOW BEDSIDE ASSESSMENT ADULT - SWALLOW EVAL: DIAGNOSIS
Pt p/w s&s oropharyngeal dysphagia, c/b impaired bolus formation, increased a/p transit time, increased mastication time, delayed initiation of pharyngeal swallow & reduced hyoid excursion. Pt p/w s&s oropharyngeal dysphagia, c/b impaired bolus formation, increased a/p transit time, increased mastication time, delayed initiation of pharyngeal swallow & reduced hyoid excursion. No overt s&s of aspiration/penetration observed during this eval.

## 2019-04-30 NOTE — SWALLOW BEDSIDE ASSESSMENT ADULT - COMMENTS
Pt AA+Ox1 ("March" "Rehab") +confused; HOB elevated to 90°. Pt followed one- & two-step directions when provided verbal and visual cues. Pt produced syntactically complex utterances, however, utterances were frequently non-contingent on the SLPs utterances (semantically unrelated). Delirium or neurological changes (?) suspected 2/2 pt's presentation. Pt p/w clear vocal quality during connected speech production, post-swallow  Did not proceed w/ solid trials 2/2 pt's history (Mcneill's esophagus & pt's self-reported difficulties w/ solids) & pt presentation

## 2019-04-30 NOTE — CHART NOTE - NSCHARTNOTEFT_GEN_A_CORE
I was notified by RN that patient is confused and keeps getting out of bed.   Spoke with ICU resident, who confirmed that patient was" found to be playing with his feces overnight as per RN and acts weird intermittently. His daughter denied any psychiatric history and said that his behavior is new. He is on olanzapine so likely has underlying dementia or psychiatric history."  Will give haldol small dose 0.5 mg IM.  Patient could have ICU related sun downing.   Will place on enhanced supervision for now, avoid benzodiazepines.    Primary team to follow.

## 2019-04-30 NOTE — CONSULT NOTE ADULT - SUBJECTIVE AND OBJECTIVE BOX
HPI:  73 y/o M with PMHx of brown's esophagus, HTN, DM, HLD, and hypothyroidism came in due to weakness. Patient states that he always had difficulty swallowing solids but since 1 week it has been getting worse. He has not been eating or drinking much since then and therefore feels extremely weak. Also states having cough productive of orange sputum since 2-3 days. No fever. No sick contacts. No SOB. Today, he went to his PMD for regular check up and was sent to ER because he "looked sick". He states feeling better now but is still very thirsty. Denies any other complaints including abdominal pain, nausea, vomiting, dysuria, hematuria, or changes in urine output.    SH: lives with this wife. Non smoker. Non alcoholic. (2019 16:00)      PAST MEDICAL & SURGICAL HISTORY:  Hypokalemia  Brown esophagus  HLD (hyperlipidemia)  History of hypertension  Diabetes  No significant past surgical history      penicillin (Rash)      Meds:  ALBUTerol/ipratropium for Nebulization 3 milliLiter(s) Nebulizer every 6 hours  atorvastatin 40 milliGRAM(s) Oral at bedtime  azithromycin  IVPB 500 milliGRAM(s) IV Intermittent every 24 hours  cefTRIAXone   IVPB 1 Gram(s) IV Intermittent every 24 hours  chlorhexidine 4% Liquid 1 Application(s) Topical every 12 hours  dextrose 40% Gel 15 Gram(s) Oral once PRN  dextrose 5%. 1000 milliLiter(s) IV Continuous <Continuous>  dextrose 50% Injectable 12.5 Gram(s) IV Push once  dextrose 50% Injectable 25 Gram(s) IV Push once  dextrose 50% Injectable 25 Gram(s) IV Push once  glucagon  Injectable 1 milliGRAM(s) IntraMuscular once PRN  guaiFENesin   Syrup  (Sugar-Free) 100 milliGRAM(s) Oral every 6 hours PRN  heparin  Injectable 5000 Unit(s) SubCutaneous every 12 hours  influenza   Vaccine 0.5 milliLiter(s) IntraMuscular once  insulin lispro (HumaLOG) corrective regimen sliding scale   SubCutaneous three times a day before meals  lactated ringers. 1000 milliLiter(s) IV Continuous <Continuous>  levothyroxine 75 MICROGram(s) Oral daily  OLANZapine 5 milliGRAM(s) Oral daily  potassium phosphate IVPB 15 milliMole(s) IV Intermittent once  tamsulosin 0.4 milliGRAM(s) Oral at bedtime  tiotropium 18 MICROgram(s) Capsule 1 Capsule(s) Inhalation daily      SOCIAL HISTORY:  Smoker:  YES / NO        PACK YEARS:                         WHEN QUIT?  ETOH use:  YES / NO               FREQUENCY / QUANTITY:  Ilicit Drug use:  YES / NO  Occupation:  Assisted device use (Cane / Walker):  Live with:    FAMILY HISTORY:      VITALS:  Vital Signs Last 24 Hrs  T(C): 36.3 (2019 17:00), Max: 36.9 (2019 12:00)  T(F): 97.3 (2019 17:00), Max: 98.5 (2019 12:00)  HR: 115 (:00) (76 - 118)  BP: 131/87 (2019 17:00) (96/52 - 152/75)  BP(mean): 95 (2019 17:00) (62 - 98)  RR: 20 (2019 17:00) (16 - 32)  SpO2: 98% (2019 17:00) (91% - 100%)    LABS/DIAGNOSTIC TESTS:                          11.2   18.68 )-----------( 318      ( 2019 06:37 )             34.7     WBC Count: 18.68 K/uL ( @ 06:37)  WBC Count: 23.27 K/uL ( @ 20:11)  WBC Count: 27.77 K/uL ( @ 16:14)  WBC Count: 28.86 K/uL ( @ 12:32)          139  |  112<H>  |  30<H>  ----------------------------<  145<H>  3.5   |  19<L>  |  1.71<H>    Ca    8.9      2019 16:36  Phos  1.9       Mg     1.8         TPro  8.2  /  Alb  2.5<L>  /  TBili  0.4  /  DBili  x   /  AST  29  /  ALT  33  /  AlkPhos  148<H>        Urinalysis Basic - ( 2019 16:38 )    Color: Yellow / Appearance: Clear / S.015 / pH: x  Gluc: x / Ketone: Negative  / Bili: Negative / Urobili: Negative   Blood: x / Protein: 100 / Nitrite: Negative   Leuk Esterase: Negative / RBC: 0-2 /HPF / WBC 0-2 /HPF   Sq Epi: x / Non Sq Epi: Few /HPF / Bacteria: Trace /HPF        LIVER FUNCTIONS - ( 2019 12:32 )  Alb: 2.5 g/dL / Pro: 8.2 g/dL / ALK PHOS: 148 U/L / ALT: 33 U/L DA / AST: 29 U/L / GGT: x             PT/INR - ( 2019 12:32 )   PT: 12.4 sec;   INR: 1.11 ratio         PTT - ( 2019 12:32 )  PTT:30.4 sec    LACTATE:Lactate, Blood: 1.6 mmol/L ( @ 16:36)  Lactate, Blood: 2.6 mmol/L ( @ 06:36)  Lactate, Blood: 3.2 mmol/L ( @ 20:11)      ABG -     CULTURES:       RADIOLOGY:< from: Xray Chest 1 View AP/PA (19 @ 12:48) >  EXAM:  XR CHEST AP OR PA 1V                            PROCEDURE DATE:  2019          INTERPRETATION:  AP sitting chest on 2019 at 12:44 PM. Patient   has sepsis.    Heart is normal for projection.    There are a few minimal densities of left hilum suggesting slight scar or   minimal atelectasis.    Chest is similar to 2015.    IMPRESSION: Unchanged chest as above.      < end of copied text >        ROS  [  ] UNABLE TO ELICIT HPI:  75 y/o M with PMHx of brown's esophagus, HTN, DM, HLD, and hypothyroidism came in due to weakness. Patient states that he always had difficulty swallowing solids but since 1 week it has been getting worse. He has not been eating or drinking much since then and therefore feels extremely weak. Also states having cough productive of orange sputum since 2-3 days. No fever. No sick contacts. No SOB. Today, he went to his PMD for regular check up and was sent to ER because he "looked sick". He states feeling better now but is still very thirsty. Denies any other complaints including abdominal pain, nausea, vomiting, dysuria, hematuria, or changes in urine output.    History as above, history gotten from above as pt is very confused , though he can answer simple questions and follow simple commands. He was in the ICU when I saw him but he was being downgraded to the floor. He was found to have possible pneumonia with bilat perihilar infiltrates. He c/o a cough but no sob or chest pain. He has no fevers.    SH: lives with this wife. Non smoker. Non alcoholic.       PAST MEDICAL & SURGICAL HISTORY:  Hypokalemia  Brown esophagus  HLD (hyperlipidemia)  History of hypertension  Diabetes  No significant past surgical history      penicillin (Rash)      Meds:  ALBUTerol/ipratropium for Nebulization 3 milliLiter(s) Nebulizer every 6 hours  atorvastatin 40 milliGRAM(s) Oral at bedtime  azithromycin  IVPB 500 milliGRAM(s) IV Intermittent every 24 hours  cefTRIAXone   IVPB 1 Gram(s) IV Intermittent every 24 hours  chlorhexidine 4% Liquid 1 Application(s) Topical every 12 hours  dextrose 40% Gel 15 Gram(s) Oral once PRN  dextrose 5%. 1000 milliLiter(s) IV Continuous <Continuous>  dextrose 50% Injectable 12.5 Gram(s) IV Push once  dextrose 50% Injectable 25 Gram(s) IV Push once  dextrose 50% Injectable 25 Gram(s) IV Push once  glucagon  Injectable 1 milliGRAM(s) IntraMuscular once PRN  guaiFENesin   Syrup  (Sugar-Free) 100 milliGRAM(s) Oral every 6 hours PRN  heparin  Injectable 5000 Unit(s) SubCutaneous every 12 hours  influenza   Vaccine 0.5 milliLiter(s) IntraMuscular once  insulin lispro (HumaLOG) corrective regimen sliding scale   SubCutaneous three times a day before meals  lactated ringers. 1000 milliLiter(s) IV Continuous <Continuous>  levothyroxine 75 MICROGram(s) Oral daily  OLANZapine 5 milliGRAM(s) Oral daily  potassium phosphate IVPB 15 milliMole(s) IV Intermittent once  tamsulosin 0.4 milliGRAM(s) Oral at bedtime  tiotropium 18 MICROgram(s) Capsule 1 Capsule(s) Inhalation daily      SOCIAL HISTORY: as above    FAMILY HISTORY: unknown      VITALS:  Vital Signs Last 24 Hrs  T(C): 36.3 (2019 17:00), Max: 36.9 (2019 12:00)  T(F): 97.3 (2019 17:00), Max: 98.5 (2019 12:00)  HR: 115 (:00) (76 - 118)  BP: 131/87 (2019 17:00) (96/52 - 152/75)  BP(mean): 95 (2019 17:00) (62 - 98)  RR: 20 (2019 17:00) (16 - 32)  SpO2: 98% (2019 17:00) (91% - 100%)    LABS/DIAGNOSTIC TESTS:                          11.2   18.68 )-----------( 318      ( 2019 06:37 )             34.7     WBC Count: 18.68 K/uL ( @ 06:37)  WBC Count: 23.27 K/uL ( @ 20:11)  WBC Count: 27.77 K/uL ( @ 16:14)  WBC Count: 28.86 K/uL ( @ 12:32)          139  |  112<H>  |  30<H>  ----------------------------<  145<H>  3.5   |  19<L>  |  1.71<H>    Ca    8.9      2019 16:36  Phos  1.9       Mg     1.8         TPro  8.2  /  Alb  2.5<L>  /  TBili  0.4  /  DBili  x   /  AST  29  /  ALT  33  /  AlkPhos  148<H>        Urinalysis Basic - ( 2019 16:38 )    Color: Yellow / Appearance: Clear / S.015 / pH: x  Gluc: x / Ketone: Negative  / Bili: Negative / Urobili: Negative   Blood: x / Protein: 100 / Nitrite: Negative   Leuk Esterase: Negative / RBC: 0-2 /HPF / WBC 0-2 /HPF   Sq Epi: x / Non Sq Epi: Few /HPF / Bacteria: Trace /HPF        LIVER FUNCTIONS - ( 2019 12:32 )  Alb: 2.5 g/dL / Pro: 8.2 g/dL / ALK PHOS: 148 U/L / ALT: 33 U/L DA / AST: 29 U/L / GGT: x             PT/INR - ( 2019 12:32 )   PT: 12.4 sec;   INR: 1.11 ratio         PTT - ( 2019 12:32 )  PTT:30.4 sec    LACTATE:Lactate, Blood: 1.6 mmol/L ( @ 16:36)  Lactate, Blood: 2.6 mmol/L ( @ 06:36)  Lactate, Blood: 3.2 mmol/L ( @ 20:11)      ABG -     CULTURES:       RADIOLOGY:< from: Xray Chest 1 View AP/PA (19 @ 12:48) >  EXAM:  XR CHEST AP OR PA 1V                            PROCEDURE DATE:  2019          INTERPRETATION:  AP sitting chest on 2019 at 12:44 PM. Patient   has sepsis.    Heart is normal for projection.    There are a few minimal densities of left hilum suggesting slight scar or   minimal atelectasis.    Chest is similar to 2015.    IMPRESSION: Unchanged chest as above.      < end of copied text >        ROS  [  ] UNABLE TO ELICIT

## 2019-04-30 NOTE — CONSULT NOTE ADULT - SUBJECTIVE AND OBJECTIVE BOX
Patient is a 74y old  Male who presents with a chief complaint of Sepsis and dehydration (30 Apr 2019 11:05)          74 year old male with a history fo brown's esophagus (last evaluated 2 years ago) HTN, HLD presented with weakness . He reports difficulty swallowing but that has worsened lately. Mostly complains of difficulty swallowing solid , he is able to tolerate liquids.  On admission he was found to have sepsis and admitted to ICU     REVIEW OF SYSTEMS  Constitutional:   No fever, no fatigue, no pallor, no night sweats, no weight loss.  HEENT:   No eye pain, no vision changes, no icterus, no mouth ulcers.  Respiratory:   No shortness of breath, no cough, no respiratory distress.   Cardiovascular:   No chest pain, no palpitations.   Gastrointestinal: No abdominal pain, no nausea, no vomiting , no diarrhea no constipation, no hematochezia, no melena.  Skin:   No rashes, no jaundice, no eczema.   Musculoskeletal:   No joint pain, no swelling, no myalgia.   Neurologic:   No headache, no seizure, no weakness.   Genitourinary:   No dysuria, no decreased urine output.  Psychiatric:  No depression, no anxiety,   Endocrine:   No thyroid disease, no diabetes.  Heme/Lymphatic:   No anemia, no blood transfusions, no lymph node enlargement, no bleeding, no bruising.  ___________________________________________________________________________________________  Allergies    penicillin (Rash)    Intolerances      MEDICATIONS  (STANDING):  ALBUTerol/ipratropium for Nebulization 3 milliLiter(s) Nebulizer every 6 hours  atorvastatin 40 milliGRAM(s) Oral at bedtime  azithromycin  IVPB 500 milliGRAM(s) IV Intermittent every 24 hours  cefTRIAXone   IVPB 1 Gram(s) IV Intermittent every 24 hours  chlorhexidine 4% Liquid 1 Application(s) Topical every 12 hours  dextrose 5%. 1000 milliLiter(s) (50 mL/Hr) IV Continuous <Continuous>  dextrose 50% Injectable 12.5 Gram(s) IV Push once  dextrose 50% Injectable 25 Gram(s) IV Push once  dextrose 50% Injectable 25 Gram(s) IV Push once  heparin  Injectable 5000 Unit(s) SubCutaneous every 12 hours  influenza   Vaccine 0.5 milliLiter(s) IntraMuscular once  insulin lispro (HumaLOG) corrective regimen sliding scale   SubCutaneous three times a day before meals  lactated ringers. 1000 milliLiter(s) (75 mL/Hr) IV Continuous <Continuous>  levothyroxine 75 MICROGram(s) Oral daily  OLANZapine 5 milliGRAM(s) Oral daily  tamsulosin 0.4 milliGRAM(s) Oral at bedtime  tiotropium 18 MICROgram(s) Capsule 1 Capsule(s) Inhalation daily    MEDICATIONS  (PRN):  dextrose 40% Gel 15 Gram(s) Oral once PRN Blood Glucose LESS THAN 70 milliGRAM(s)/deciliter  glucagon  Injectable 1 milliGRAM(s) IntraMuscular once PRN Glucose LESS THAN 70 milligrams/deciliter  guaiFENesin   Syrup  (Sugar-Free) 100 milliGRAM(s) Oral every 6 hours PRN Cough      PAST MEDICAL & SURGICAL HISTORY:  Hypokalemia  Brown esophagus  HLD (hyperlipidemia)  History of hypertension  Diabetes  No significant past surgical history    FAMILY HISTORY:    Social History: No hsitory of : Tobacco use, IVDA, EToH  ______________________________________________________________________________________    PHYSICAL EXAM    Daily Height in cm: 154.68 (29 Apr 2019 17:15)    Daily   BMI: 31 (04-29 @ 17:15)  Change in Weight:  Vital Signs Last 24 Hrs  T(C): 36.9 (30 Apr 2019 12:00), Max: 36.9 (30 Apr 2019 12:00)  T(F): 98.5 (30 Apr 2019 12:00), Max: 98.5 (30 Apr 2019 12:00)  HR: 89 (30 Apr 2019 13:00) (76 - 118)  BP: 117/63 (30 Apr 2019 13:00) (96/52 - 152/75)  BP(mean): 76 (30 Apr 2019 13:00) (54 - 98)  RR: 24 (30 Apr 2019 13:00) (16 - 32)  SpO2: 99% (30 Apr 2019 13:00) (91% - 100%)    General:   NAD.  HEENT:    Normal appearance of conjunctiva, ears, nose, lips, oropharynx, and oral mucosa, anicteric.  Neck:  No masses, no asymmetry.  Lymph Nodes:  No lymphadenopathy.   Cardiovascular:  RRR normal S1/S2, no murmur.  Respiratory:  CTA B/L, normal respiratory effort.   Abdominal:   soft, no masses or tenderness, normoactive BS, NT/ND, no HSM.     Neuro: No focal deficits.   Other:   _______________________________________________________________________________________________  Lab Results:                          11.2   18.68 )-----------( 318      ( 30 Apr 2019 06:37 )             34.7     04-30    137  |  109<H>  |  38<H>  ----------------------------<  153<H>  3.1<L>   |  18<L>  |  2.04<H>    Ca    9.0      30 Apr 2019 06:37  Phos  1.8     04-30  Mg     1.5     04-30    TPro  8.2  /  Alb  2.5<L>  /  TBili  0.4  /  DBili  x   /  AST  29  /  ALT  33  /  AlkPhos  148<H>  04-29    LIVER FUNCTIONS - ( 29 Apr 2019 12:32 )  Alb: 2.5 g/dL / Pro: 8.2 g/dL / ALK PHOS: 148 U/L / ALT: 33 U/L DA / AST: 29 U/L / GGT: x           PT/INR - ( 29 Apr 2019 12:32 )   PT: 12.4 sec;   INR: 1.11 ratio         PTT - ( 29 Apr 2019 12:32 )  PTT:30.4 sec  Triglycerides, Serum: 171 mg/dL (04-29 @ 20:12)        Stool Results:          RADIOLOGY RESULTS:    SURGICAL PATHOLOGY:

## 2019-04-30 NOTE — CONSULT NOTE ADULT - SUBJECTIVE AND OBJECTIVE BOX
74y Male with PMHx of HTN, DM, HLD, hypothyroidism admitted with sepsis and dehydration. Pt was found to be in urinary retention and negron was placed on admission. Pt denies history of urinary retention and denies being on medicine for urinary issues in the past. Pt denies fevers at home, no nausea or vomiting no chest pain or abdominal pain.        pmhx: as above  pshx: none  meds:  ALBUTerol/ipratropium for Nebulization 3 milliLiter(s) Nebulizer every 6 hours  atorvastatin 40 milliGRAM(s) Oral at bedtime  azithromycin  IVPB 500 milliGRAM(s) IV Intermittent every 24 hours  cefTRIAXone   IVPB 1 Gram(s) IV Intermittent every 24 hours  chlorhexidine 4% Liquid 1 Application(s) Topical every 12 hours  dextrose 40% Gel 15 Gram(s) Oral once PRN  dextrose 5%. 1000 milliLiter(s) IV Continuous <Continuous>  dextrose 50% Injectable 12.5 Gram(s) IV Push once  dextrose 50% Injectable 25 Gram(s) IV Push once  dextrose 50% Injectable 25 Gram(s) IV Push once  glucagon  Injectable 1 milliGRAM(s) IntraMuscular once PRN  guaiFENesin   Syrup  (Sugar-Free) 100 milliGRAM(s) Oral every 6 hours PRN  heparin  Injectable 5000 Unit(s) SubCutaneous every 12 hours  influenza   Vaccine 0.5 milliLiter(s) IntraMuscular once  insulin lispro (HumaLOG) corrective regimen sliding scale   SubCutaneous three times a day before meals  lactated ringers. 1000 milliLiter(s) IV Continuous <Continuous>  levothyroxine 75 MICROGram(s) Oral daily  OLANZapine 5 milliGRAM(s) Oral daily  tamsulosin 0.4 milliGRAM(s) Oral at bedtime  tiotropium 18 MICROgram(s) Capsule 1 Capsule(s) Inhalation daily    penicillin (Rash)      T(C): 36.9 (04-30-19 @ 12:00), Max: 36.9 (04-30-19 @ 12:00)  HR: 114 (04-30-19 @ 14:00) (76 - 118)  BP: 135/70 (04-30-19 @ 14:00) (96/52 - 152/75)  RR: 25 (04-30-19 @ 14:00) (16 - 32)  SpO2: 96% (04-30-19 @ 14:00) (91% - 100%)  Wt(kg): --    Gen:A&O x3, NAD  SKin: no rashes, no jaundice  Abdomen: soft, nontender, nondistended, no masses  : normal external genitalia no suprapubic tenderness, negron in place with clear urine and good output  Lower Extremities: no edema, no skin discoloration, no calf tenderness bautista        04-29 @ 07:01 - 04-30 @ 07:00  --------------------------------------------------------  IN: 3150 mL / OUT: 0 mL / NET: 3150 mL    04-30 @ 07:01 - 04-30 @ 15:20  --------------------------------------------------------  IN: 825 mL / OUT: 1300 mL / NET: -475 mL                            11.2   18.68 )-----------( 318      ( 30 Apr 2019 06:37 )             34.7   04-30    137  |  109<H>  |  38<H>  ----------------------------<  153<H>  3.1<L>   |  18<L>  |  2.04<H>    Ca    9.0      30 Apr 2019 06:37  Phos  1.8     04-30  Mg     1.5     04-30    TPro  8.2  /  Alb  2.5<L>  /  TBili  0.4  /  DBili  x   /  AST  29  /  ALT  33  /  AlkPhos  148<H>  04-29

## 2019-04-30 NOTE — SWALLOW BEDSIDE ASSESSMENT ADULT - SWALLOW EVAL: RECOMMENDED FEEDING/EATING TECHNIQUES
maintain upright posture during/after eating for 30 mins/oral hygiene/1:1 supervision during mealtime/alternate food with liquid/allow for swallow between intakes/crush medication (when feasible)/position upright (90 degrees)/small sips/bites

## 2019-04-30 NOTE — SWALLOW BEDSIDE ASSESSMENT ADULT - CONSISTENCIES ADMINISTERED
x2 ice-chips; x2 tsp water; x3 straw-sips water/thin liquid puree/x3 tsp applesauce x2 tsp asuncion cracker & applesauce/mech soft

## 2019-04-30 NOTE — SWALLOW BEDSIDE ASSESSMENT ADULT - PHARYNGEAL PHASE
Multiple swallows/slightly delayed initiation of pharyngeal swallow slightly delayed initiation of pharyngeal swallow slightly delayed initiation of pharyngeal swallow/Multiple swallows

## 2019-04-30 NOTE — SWALLOW BEDSIDE ASSESSMENT ADULT - ASR SWALLOW LINGUAL MOBILITY
impaired anterior elevation/reduced ROM/impaired left lateral movement/impaired right lateral movement

## 2019-04-30 NOTE — CONSULT NOTE ADULT - ASSESSMENT
75yo male with urinary retention, elevated creatinine     1) f/u renal sono  2) cont negron and monitor output  3) trend creatinine   4) dvt prophylaxis  5) case and plan discussed with Dr. Perez and agrees 73yo male with urinary retention, elevated creatinine     1) f/u renal sono  2) cont negron and monitor output  3) trend creatinine   4) dvt prophylaxis  5) continue flomax  6) case and plan discussed with Dr. Perez and agrees

## 2019-04-30 NOTE — SWALLOW BEDSIDE ASSESSMENT ADULT - SLP GENERAL OBSERVATIONS
Pt frequently spoke w/ food/ liquid in mouth; pt provided verbal cues to refrain from speaking while eating/ drinking to ensure pt's safety

## 2019-04-30 NOTE — SWALLOW BEDSIDE ASSESSMENT ADULT - SLP PERTINENT HISTORY OF CURRENT PROBLEM
73 y/o male with PMHx of brown's esophagus, HTN, DM, HLD, & hypothyroidism. Pt presented to this facility (4/29/19) 2/2 weakness. Patient reports that he always had difficulty swallowing solids but since x1 week, prior to admission, it has been getting worse. Reportedly, pt has not been eating/ drinking much since then and therefore feels extremely weak. Additionally, pt reports cough w/ productive of orange sputum for x2-3 days, prior to admission. No fever. No sick contacts. No SOB. On day of admission, pt went to his PMD for regular check up and was sent to ER because he "looked sick".  CXR (-) for consolidation.

## 2019-04-30 NOTE — SWALLOW BEDSIDE ASSESSMENT ADULT - ORAL PREPARATORY PHASE
suspected reduced bolus control on water trials/Refuses to accept bolus into oral cavity impaired bolus formation/ manipulation increased mastication time suspected reduced bolus control on water trials

## 2019-04-30 NOTE — PROGRESS NOTE ADULT - PROBLEM SELECTOR PLAN 2
likely prerenal 2/2 to hypovolemia vs drug induced (on losartan, HCTz, and metformin ER)  likely has CKD-3   - hold off to the medications  - c/w IVF  - FU urine lytes and US kidney  - Monitor BMP

## 2019-04-30 NOTE — CONSULT NOTE ADULT - GASTROINTESTINAL DETAILS
no rigidity/no masses palpable/bowel sounds normal/soft/no organomegaly/nontender/no guarding/no distention

## 2019-04-30 NOTE — PROGRESS NOTE ADULT - ATTENDING COMMENTS
Patient seen and examined with resident, Addendum to above.    73 y/o M with PMHx of brown's esophagus, HTN, DM, HLD, and hypothyroidism presenting with generalized malaise and poor oral intake. Does not endorse any specific complaints except for poor oral intake. No nausea, vomiting, or diarrhea. No abdominal pain. On exam appears dehydrated with poor skin turgor.     Assessment:  1. Sepsis - suspect infection though no obvious source, ?Bronchitis. Will cover empirically with antibiotics. Cultures negative thus far.   2. Acute renal failure - unclear baseline. Appears severely dehydrated, possibly ATN due to sepsis. Monitor urine output. Check urine studies including electrolytes and UA. Check US of the kidneys. Jiménez placed suggestive of urinary retention. Urology consulted.   3. Lactic acidosis - possibly due to dehydration. Vs. due to metformin toxicity as patient with acute renal failure. Improved with hydration.   4. Diabetes - monitor fingerstick coverage with insulin.   5. Hypothyroidism - cont. Levothyroxine    - Likely symptoms due to poor oral intake and dehydration,   - DVT prophylaxis.  - Transfer out of ICU today

## 2019-04-30 NOTE — SWALLOW BEDSIDE ASSESSMENT ADULT - ASR SWALLOW ASPIRATION MONITOR
change of breathing pattern/fever/pneumonia/throat clearing/upper respiratory infection/gurgly voice/cough

## 2019-04-30 NOTE — PROGRESS NOTE ADULT - PROBLEM SELECTOR PLAN 8
dx 3 years ago via EGD  worsening difficulty to swallowing solids  - on puree diet until speech swallow eval   - GI- Dr. Saldana

## 2019-04-30 NOTE — PROGRESS NOTE ADULT - PROBLEM SELECTOR PLAN 9
- negron placed in AM for retention, drained 800ml urine  - started flomax  - FU US kidney  - urology consulted

## 2019-04-30 NOTE — CONSULT NOTE ADULT - ASSESSMENT
74 year old male with sepsis. Gi consulted for dysphagia to solids.     Plan:   Please obtain a barium esophogram when out of MICU   EGD when out of MICU   Further management as per ICU .

## 2019-04-30 NOTE — PROGRESS NOTE ADULT - PROBLEM SELECTOR PLAN 1
resolved, likely 2/2 to bronchitis?  leukocytosis and lactate trending down, afebrile  UA negative  EKG- sinus tachy  CXR- no consolidation seen  s/p 2700 ml NS bolus + aztronam and vanc x 1 in ED  -on rocephin and azithromycin D2  - duoneb q6hr  - f/u urine legionella  - f/u blood cx

## 2019-04-30 NOTE — PROGRESS NOTE ADULT - SUBJECTIVE AND OBJECTIVE BOX
INTERVAL HPI/ OVERNIGHT EVENTS: no acute overnight events, pt. seen and examined at bedside, offers no complaints. As per RN, was playing with feces overnight. He had urinary retention and negron placed in AM.     PRESSORS: [ ] YES [x ] NO  WHICH:    ANTIBIOTICS:                  DATE STARTED:  ANTIBIOTICS:                  DATE STARTED:  ANTIBIOTICS:                  DATE STARTED:    Antimicrobial:  azithromycin  IVPB 500 milliGRAM(s) IV Intermittent every 24 hours  cefTRIAXone   IVPB 1 Gram(s) IV Intermittent every 24 hours    Cardiovascular:  tamsulosin 0.4 milliGRAM(s) Oral at bedtime    Pulmonary:  ALBUTerol/ipratropium for Nebulization 3 milliLiter(s) Nebulizer every 6 hours  guaiFENesin   Syrup  (Sugar-Free) 100 milliGRAM(s) Oral every 6 hours PRN  tiotropium 18 MICROgram(s) Capsule 1 Capsule(s) Inhalation daily    Hematalogic:  heparin  Injectable 5000 Unit(s) SubCutaneous every 12 hours    Other:  atorvastatin 40 milliGRAM(s) Oral at bedtime  chlorhexidine 4% Liquid 1 Application(s) Topical every 12 hours  dextrose 40% Gel 15 Gram(s) Oral once PRN  dextrose 5%. 1000 milliLiter(s) IV Continuous <Continuous>  dextrose 50% Injectable 12.5 Gram(s) IV Push once  dextrose 50% Injectable 25 Gram(s) IV Push once  dextrose 50% Injectable 25 Gram(s) IV Push once  glucagon  Injectable 1 milliGRAM(s) IntraMuscular once PRN  influenza   Vaccine 0.5 milliLiter(s) IntraMuscular once  insulin lispro (HumaLOG) corrective regimen sliding scale   SubCutaneous three times a day before meals  lactated ringers. 1000 milliLiter(s) IV Continuous <Continuous>  levothyroxine 75 MICROGram(s) Oral daily  OLANZapine 5 milliGRAM(s) Oral daily    ALBUTerol/ipratropium for Nebulization 3 milliLiter(s) Nebulizer every 6 hours  atorvastatin 40 milliGRAM(s) Oral at bedtime  azithromycin  IVPB 500 milliGRAM(s) IV Intermittent every 24 hours  cefTRIAXone   IVPB 1 Gram(s) IV Intermittent every 24 hours  chlorhexidine 4% Liquid 1 Application(s) Topical every 12 hours  dextrose 40% Gel 15 Gram(s) Oral once PRN  dextrose 5%. 1000 milliLiter(s) IV Continuous <Continuous>  dextrose 50% Injectable 12.5 Gram(s) IV Push once  dextrose 50% Injectable 25 Gram(s) IV Push once  dextrose 50% Injectable 25 Gram(s) IV Push once  glucagon  Injectable 1 milliGRAM(s) IntraMuscular once PRN  guaiFENesin   Syrup  (Sugar-Free) 100 milliGRAM(s) Oral every 6 hours PRN  heparin  Injectable 5000 Unit(s) SubCutaneous every 12 hours  influenza   Vaccine 0.5 milliLiter(s) IntraMuscular once  insulin lispro (HumaLOG) corrective regimen sliding scale   SubCutaneous three times a day before meals  lactated ringers. 1000 milliLiter(s) IV Continuous <Continuous>  levothyroxine 75 MICROGram(s) Oral daily  OLANZapine 5 milliGRAM(s) Oral daily  tamsulosin 0.4 milliGRAM(s) Oral at bedtime  tiotropium 18 MICROgram(s) Capsule 1 Capsule(s) Inhalation daily    Drug Dosing Weight  Height (cm): 154.68 (2019 17:15)  Weight (kg): 74.1 (2019 17:15)  BMI (kg/m2): 31 (2019 17:15)  BSA (m2): 1.73 (2019 17:15)    CENTRAL LINE: [ ] YES [x ] NO  LOCATION:         NEGRON: [ ] YES [x ] NO          A-LINE:  [ ] YES [x ] NO  LOCATION:             ICU Vital Signs Last 24 Hrs  T(C): 36.5 (2019 07:00), Max: 37.6 (2019 11:44)  T(F): 97.7 (2019 07:00), Max: 99.6 (2019 11:44)  HR: 96 (2019 10:00) (76 - 119)  BP: 115/53 (2019 10:00) (96/52 - 150/76)  BP(mean): 69 (2019 10:00) (54 - 98)  ABP: --  ABP(mean): --  RR: 26 (2019 10:00) (16 - 32)  SpO2: 97% (2019 10:00) (91% - 100%)             @ 07:01  -   @ 07:00  --------------------------------------------------------  IN: 3150 mL / OUT: 0 mL / NET: 3150 mL            REVIEW OF SYSTEMS:    CONSTITUTIONAL: No weakness, fevers or chills  NECK: No pain or stiffness  RESPIRATORY: No cough, wheezing, hemoptysis; No shortness of breath  CARDIOVASCULAR: No chest pain or palpitations  GASTROINTESTINAL: No abdominal or epigastric pain. No nausea, vomiting, No diarrhea or constipation. No melena or hematochezia.  GENITOURINARY: No dysuria, frequency or hematuria  NEUROLOGICAL: No numbness or weakness  All other review of systems is negative unless indicated above      PHYSICAL EXAM:    GENERAL: NAD, well-groomed, well-developed  EYES: EOMI, PERRLA,   NECK: Supple, No JVD; Normal thyroid; Trachea midline  NERVOUS SYSTEM:  Alert & Oriented X3,  Motor Strength 5/5 B/L upper and lower extremities; DTRs 2+ intact and symmetric  CHEST/LUNG: No rales, rhonchi, wheezing   HEART: Regular rate and rhythm; No murmurs,   ABDOMEN: Soft, Nontender, Nondistended; Bowel sounds present  EXTREMITIES:  2+ Peripheral Pulses, No clubbing, cyanosis, or edema        LABS:  CBC Full  -  ( 2019 06:37 )  WBC Count : 18.68 K/uL  RBC Count : 3.76 M/uL  Hemoglobin : 11.2 g/dL  Hematocrit : 34.7 %  Platelet Count - Automated : 318 K/uL  Mean Cell Volume : 92.3 fl  Mean Cell Hemoglobin : 29.8 pg  Mean Cell Hemoglobin Concentration : 32.3 gm/dL  Auto Neutrophil # : 14.84 K/uL  Auto Lymphocyte # : 1.98 K/uL  Auto Monocyte # : 1.44 K/uL  Auto Eosinophil # : 0.23 K/uL  Auto Basophil # : 0.04 K/uL  Auto Neutrophil % : 79.5 %  Auto Lymphocyte % : 10.6 %  Auto Monocyte % : 7.7 %  Auto Eosinophil % : 1.2 %  Auto Basophil % : 0.2 %        137  |  109<H>  |  38<H>  ----------------------------<  153<H>  3.1<L>   |  18<L>  |  2.04<H>    Ca    9.0      2019 06:37  Phos  1.8     04-30  Mg     1.5     04-30    TPro  8.2  /  Alb  2.5<L>  /  TBili  0.4  /  DBili  x   /  AST  29  /  ALT  33  /  AlkPhos  148<H>      PT/INR - ( 2019 12:32 )   PT: 12.4 sec;   INR: 1.11 ratio         PTT - ( 2019 12:32 )  PTT:30.4 sec  Urinalysis Basic - ( 2019 16:38 )    Color: Yellow / Appearance: Clear / S.015 / pH: x  Gluc: x / Ketone: Negative  / Bili: Negative / Urobili: Negative   Blood: x / Protein: 100 / Nitrite: Negative   Leuk Esterase: Negative / RBC: 0-2 /HPF / WBC 0-2 /HPF   Sq Epi: x / Non Sq Epi: Few /HPF / Bacteria: Trace /HPF          RADIOLOGY & ADDITIONAL STUDIES REVIEWED:  YES    [ ]GOALS OF CARE DISCUSSION WITH PATIENT/FAMILY/PROXY:    CRITICAL CARE TIME SPENT: 35 minutes INTERVAL HPI/ OVERNIGHT EVENTS: no acute overnight events, pt. seen and examined at bedside, offers no complaints. As per RN, was playing with feces overnight. He had urinary retention and negron placed in AM.     PRESSORS: [ ] YES [x ] NO  WHICH:    ANTIBIOTICS:                  DATE STARTED:  ANTIBIOTICS:                  DATE STARTED:  ANTIBIOTICS:                  DATE STARTED:    Antimicrobial:  azithromycin  IVPB 500 milliGRAM(s) IV Intermittent every 24 hours  cefTRIAXone   IVPB 1 Gram(s) IV Intermittent every 24 hours    Cardiovascular:  tamsulosin 0.4 milliGRAM(s) Oral at bedtime    Pulmonary:  ALBUTerol/ipratropium for Nebulization 3 milliLiter(s) Nebulizer every 6 hours  guaiFENesin   Syrup  (Sugar-Free) 100 milliGRAM(s) Oral every 6 hours PRN  tiotropium 18 MICROgram(s) Capsule 1 Capsule(s) Inhalation daily    Hematalogic:  heparin  Injectable 5000 Unit(s) SubCutaneous every 12 hours    Other:  atorvastatin 40 milliGRAM(s) Oral at bedtime  chlorhexidine 4% Liquid 1 Application(s) Topical every 12 hours  dextrose 40% Gel 15 Gram(s) Oral once PRN  dextrose 5%. 1000 milliLiter(s) IV Continuous <Continuous>  dextrose 50% Injectable 12.5 Gram(s) IV Push once  dextrose 50% Injectable 25 Gram(s) IV Push once  dextrose 50% Injectable 25 Gram(s) IV Push once  glucagon  Injectable 1 milliGRAM(s) IntraMuscular once PRN  influenza   Vaccine 0.5 milliLiter(s) IntraMuscular once  insulin lispro (HumaLOG) corrective regimen sliding scale   SubCutaneous three times a day before meals  lactated ringers. 1000 milliLiter(s) IV Continuous <Continuous>  levothyroxine 75 MICROGram(s) Oral daily  OLANZapine 5 milliGRAM(s) Oral daily    ALBUTerol/ipratropium for Nebulization 3 milliLiter(s) Nebulizer every 6 hours  atorvastatin 40 milliGRAM(s) Oral at bedtime  azithromycin  IVPB 500 milliGRAM(s) IV Intermittent every 24 hours  cefTRIAXone   IVPB 1 Gram(s) IV Intermittent every 24 hours  chlorhexidine 4% Liquid 1 Application(s) Topical every 12 hours  dextrose 40% Gel 15 Gram(s) Oral once PRN  dextrose 5%. 1000 milliLiter(s) IV Continuous <Continuous>  dextrose 50% Injectable 12.5 Gram(s) IV Push once  dextrose 50% Injectable 25 Gram(s) IV Push once  dextrose 50% Injectable 25 Gram(s) IV Push once  glucagon  Injectable 1 milliGRAM(s) IntraMuscular once PRN  guaiFENesin   Syrup  (Sugar-Free) 100 milliGRAM(s) Oral every 6 hours PRN  heparin  Injectable 5000 Unit(s) SubCutaneous every 12 hours  influenza   Vaccine 0.5 milliLiter(s) IntraMuscular once  insulin lispro (HumaLOG) corrective regimen sliding scale   SubCutaneous three times a day before meals  lactated ringers. 1000 milliLiter(s) IV Continuous <Continuous>  levothyroxine 75 MICROGram(s) Oral daily  OLANZapine 5 milliGRAM(s) Oral daily  tamsulosin 0.4 milliGRAM(s) Oral at bedtime  tiotropium 18 MICROgram(s) Capsule 1 Capsule(s) Inhalation daily    Drug Dosing Weight  Height (cm): 154.68 (2019 17:15)  Weight (kg): 74.1 (2019 17:15)  BMI (kg/m2): 31 (2019 17:15)  BSA (m2): 1.73 (2019 17:15)    CENTRAL LINE: [ ] YES [x ] NO  LOCATION:       NEGRON: [ ] YES [x ] NO        A-LINE:  [ ] YES [x ] NO  LOCATION:     ICU Vital Signs Last 24 Hrs  T(C): 36.5 (2019 07:00), Max: 37.6 (2019 11:44)  T(F): 97.7 (2019 07:00), Max: 99.6 (2019 11:44)  HR: 96 (2019 10:00) (76 - 119)  BP: 115/53 (2019 10:00) (96/52 - 150/76)  BP(mean): 69 (2019 10:00) (54 - 98)  ABP: --  ABP(mean): --  RR: 26 (2019 10:00) (16 - 32)  SpO2: 97% (2019 10:00) (91% - 100%)     @ 07:01  -   @ 07:00  --------------------------------------------------------  IN: 3150 mL / OUT: 0 mL / NET: 3150 mL    REVIEW OF SYSTEMS:    CONSTITUTIONAL: No weakness, fevers or chills  NECK: No pain or stiffness  RESPIRATORY: No cough, wheezing, hemoptysis; No shortness of breath  CARDIOVASCULAR: No chest pain or palpitations  GASTROINTESTINAL: No abdominal or epigastric pain. No nausea, vomiting, No diarrhea or constipation. No melena or hematochezia.  GENITOURINARY: No dysuria, frequency or hematuria  NEUROLOGICAL: No numbness or weakness  All other review of systems is negative unless indicated above      PHYSICAL EXAM:  GENERAL: NAD, well-groomed, well-developed  EYES: EOMI, PERRLA,   NECK: Supple, No JVD; Normal thyroid; Trachea midline  NERVOUS SYSTEM:  Alert & Oriented X3,  Motor Strength 5/5 B/L upper and lower extremities; DTRs 2+ intact and symmetric  CHEST/LUNG: No rales, rhonchi, wheezing   HEART: Regular rate and rhythm; No murmurs,   ABDOMEN: Soft, Nontender, Nondistended; Bowel sounds present  EXTREMITIES:  2+ Peripheral Pulses, No clubbing, cyanosis, or edema        LABS:  CBC Full  -  ( 2019 06:37 )  WBC Count : 18.68 K/uL  RBC Count : 3.76 M/uL  Hemoglobin : 11.2 g/dL  Hematocrit : 34.7 %  Platelet Count - Automated : 318 K/uL  Mean Cell Volume : 92.3 fl  Mean Cell Hemoglobin : 29.8 pg  Mean Cell Hemoglobin Concentration : 32.3 gm/dL  Auto Neutrophil # : 14.84 K/uL  Auto Lymphocyte # : 1.98 K/uL  Auto Monocyte # : 1.44 K/uL  Auto Eosinophil # : 0.23 K/uL  Auto Basophil # : 0.04 K/uL  Auto Neutrophil % : 79.5 %  Auto Lymphocyte % : 10.6 %  Auto Monocyte % : 7.7 %  Auto Eosinophil % : 1.2 %  Auto Basophil % : 0.2 %        137  |  109<H>  |  38<H>  ----------------------------<  153<H>  3.1<L>   |  18<L>  |  2.04<H>    Ca    9.0      2019 06:37  Phos  1.8     04-30  Mg     1.5     04-30    TPro  8.2  /  Alb  2.5<L>  /  TBili  0.4  /  DBili  x   /  AST  29  /  ALT  33  /  AlkPhos  148<H>      PT/INR - ( 2019 12:32 )   PT: 12.4 sec;   INR: 1.11 ratio         PTT - ( 2019 12:32 )  PTT:30.4 sec  Urinalysis Basic - ( 2019 16:38 )    Color: Yellow / Appearance: Clear / S.015 / pH: x  Gluc: x / Ketone: Negative  / Bili: Negative / Urobili: Negative   Blood: x / Protein: 100 / Nitrite: Negative   Leuk Esterase: Negative / RBC: 0-2 /HPF / WBC 0-2 /HPF   Sq Epi: x / Non Sq Epi: Few /HPF / Bacteria: Trace /HPF          RADIOLOGY & ADDITIONAL STUDIES REVIEWED:  YES    [ ]GOALS OF CARE DISCUSSION WITH PATIENT/FAMILY/PROXY:    CRITICAL CARE TIME SPENT: 35 minutes

## 2019-04-30 NOTE — CHART NOTE - NSCHARTNOTEFT_GEN_A_CORE
73 y/o M with PMHx of brown's esophagus, HTN, DM, HLD, and hypothyroidism came in due to weakness. Patient states that he always had difficulty swallowing solids but since 1 week it has been getting worse. He has not been eating or drinking much since then and therefore feels extremely weak. Also states having cough productive of orange sputum since 2-3 days. No fever. No sick contacts. No SOB. Today, he went to his PMD for regular check up and was sent to ER because he "looked sick". He states feeling better now but is still very thirsty. Denies any other complaints including abdominal pain, nausea, vomiting, dysuria, hematuria, or changes in urine output. ICU was consulted for sepsis. He was transferred to ICU for sepsis likely 2/2 to bronchitis? with leukocytosis and lactate trending down, afebrile, has UA negative and EKG- sinus tachy, CXR- no consolidation seen. He is started on rocephin and azithromycin and duoneb q6hr. He has BLANCHE likely prerenal 2/2 to hypovolemia vs drug induced (on losartan, HCTz, and metformin ER), likely has CKD-3 component, c/w IVF. He has Brown esophagus dx 3 years ago via EGD and is started on mechanical soft diet after speech swallow eval. GI- Dr. Saldana was consulted. He had Urine retention in AM  and negron placed in AM for retention, drained 800ml urine and was on started flomax. Urology was consulted.       To monitor/follow:  - f/u urine legionella  - f/u blood cx.   - FU urine lytes and US kidney  - Monitor BMP.   - FU US kidney      Discussed with PGY-1/NP covering Floor and Attending Dr. Gee. Patient will be transferred to floor. Discussed with ICU attending. Patient clinically stable for downgrade 75 y/o M with PMHx of brown's esophagus, HTN, DM, HLD, and hypothyroidism came in due to weakness. Patient states that he always had difficulty swallowing solids but since 1 week it has been getting worse. He has not been eating or drinking much since then and therefore feels extremely weak. Also states having cough productive of orange sputum since 2-3 days. No fever. No sick contacts. No SOB. Today, he went to his PMD for regular check up and was sent to ER because he "looked sick". He states feeling better now but is still very thirsty. Denies any other complaints including abdominal pain, nausea, vomiting, dysuria, hematuria, or changes in urine output. ICU was consulted for sepsis. He was transferred to ICU for sepsis likely 2/2 to bronchitis? with leukocytosis and lactate trending down, afebrile, has UA negative and EKG- sinus tachy, CXR- no consolidation seen. He is started on rocephin and azithromycin and duoneb q6hr. He has BLANCHE likely prerenal 2/2 to hypovolemia vs drug induced (on losartan, HCTz, and metformin ER), likely has CKD-3 component, c/w IVF. He has Brown esophagus dx 3 years ago via EGD and is started on mechanical soft diet after speech swallow eval. GI- Dr. Saldana was consulted. He had Urine retention in AM  and negron placed in AM for retention, drained 800ml urine and was on started flomax. Urology was consulted.       To monitor/follow:  - f/u urine legionella  - f/u blood cx.   - FU urine lytes and US kidney  - Monitor BMP.   - FU US kidney      Discussed with PGY-1/NP covering Floor and Attending   . Patient will be transferred to floor. Discussed with ICU attending. Patient clinically stable for downgrade. 73 y/o M with PMHx of brown's esophagus, HTN, DM, HLD, and hypothyroidism came in due to weakness. Patient states that he always had difficulty swallowing solids but since 1 week it has been getting worse. He has not been eating or drinking much since then and therefore feels extremely weak. Also states having cough productive of orange sputum since 2-3 days. No fever. No sick contacts. No SOB. Today, he went to his PMD for regular check up and was sent to ER because he "looked sick". He states feeling better now but is still very thirsty. Denies any other complaints including abdominal pain, nausea, vomiting, dysuria, hematuria, or changes in urine output. ICU was consulted for sepsis. He was admitted to ICU for sepsis likely 2/2 to bronchitis?. His leukocytosis and lactate trending down, is afebrile, has UA negative and EKG- sinus tachy, CXR- no consolidation seen. He is started on rocephin and azithromycin and duoneb q6hr. He has BLANCHE likely prerenal 2/2 to hypovolemia vs drug induced (on losartan, HCTz, and metformin ER), likely has CKD-3 component, c/w IVF. He has Brown esophagus dx 3 years ago via EGD and is started on mechanical soft diet after speech swallow eval. GI- Dr. Saldana was consulted. He had Urine retention in AM  and negron placed in AM for retention, drained 800ml urine and was on started flomax. Urology was consulted.       To monitor/follow:  - f/u urine legionella  - f/u blood cx.   - FU urine lytes and US kidney  - Monitor BMP.   - FU US kidney      Discussed with PGY-1 covering Floor and Attending Dr. Cortez . Patient will be transferred to floor. Discussed with ICU attending. Patient clinically stable for downgrade. 75 y/o M with PMHx of brown's esophagus, HTN, DM, HLD, and hypothyroidism came in due to weakness. Patient states that he always had difficulty swallowing solids but since 1 week it has been getting worse. He has not been eating or drinking much since then and therefore feels extremely weak. Also states having cough productive of orange sputum since 2-3 days. No fever. No sick contacts. No SOB. Today, he went to his PMD for regular check up and was sent to ER because he "looked sick". He states feeling better now but is still very thirsty. Denies any other complaints including abdominal pain, nausea, vomiting, dysuria, hematuria, or changes in urine output. ICU was consulted for sepsis. He was admitted to ICU for sepsis likely 2/2 to bronchitis?. His leukocytosis and lactate trending down, is afebrile, has UA negative and EKG- sinus tachy, CXR- no consolidation seen. He is started on rocephin and azithromycin and duoneb q6hr. He has BLANCHE likely prerenal 2/2 to hypovolemia vs drug induced (on losartan, HCTz, and metformin ER), likely has CKD-3 component, c/w IVF. He has Brown esophagus dx 3 years ago via EGD and is started on mechanical soft diet after speech swallow eval. GI- Dr. Saldana was consulted. He had Urine retention in AM  and negron placed in AM for retention, drained 800ml urine and was on started flomax. Urology was consulted. He was playing with his feces overnight as per RN and acts weird intermittently. His daughter denies any psychiatric history and says his behavior is new. He is on olanzapine so likely has underlying dementia or psychiatric history.      To monitor/follow:  - CT head, TSH, Vit. B12, RPR  - f/u urine legionella  - f/u blood cx.   - FU urine lytes and US kidney  - Monitor BMP.   - FU US kidney      Discussed with PGY-1 covering Floor and Attending Dr. Cortez . Patient will be transferred to floor. Discussed with ICU attending. Patient clinically stable for downgrade. 73 y/o M with PMHx of brown's esophagus, HTN, DM, HLD, and hypothyroidism came in due to weakness. Patient states that he always had difficulty swallowing solids but since 1 week it has been getting worse. He has not been eating or drinking much since then and therefore feels extremely weak. Also states having cough productive of orange sputum since 2-3 days. No fever. No sick contacts. No SOB. Today, he went to his PMD for regular check up and was sent to ER because he "looked sick". He states feeling better now but is still very thirsty. Denies any other complaints including abdominal pain, nausea, vomiting, dysuria, hematuria, or changes in urine output. ICU was consulted for sepsis. He was admitted to ICU for sepsis likely 2/2 to bronchitis?. His leukocytosis and lactate trending down, is afebrile, has UA negative and EKG- sinus tachy, CXR- no consolidation seen. He is started on rocephin and azithromycin and duoneb q6hr. He has BLANCHE likely prerenal 2/2 to hypovolemia vs drug induced (on losartan, HCTz, and metformin ER), likely has CKD-3 component, c/w IVF. He has Brown esophagus dx 3 years ago via EGD and is started on mechanical soft diet after speech swallow eval. GI- Dr. Saldana was consulted. He had Urine retention in AM  and negron placed in AM for retention, drained 800ml urine and was on started flomax. Urology was consulted. He was playing with his feces overnight as per RN and acts weird intermittently. His daughter denies any psychiatric history and says his behavior is new. He is on olanzapine so likely has underlying dementia or psychiatric history.      To monitor/follow:  - CT head, TSH, Vit. B12, RPR  - f/u urine legionella  - f/u blood cx.   - FU urine lytes and US kidney  - Monitor BMP.   - FU US kidney      Discussed with PGY-1 covering Floor and Attending Dr. Walker . Patient will be transferred to floor. Discussed with ICU attending. Patient clinically stable for downgrade.

## 2019-05-01 LAB
ANION GAP SERPL CALC-SCNC: 10 MMOL/L — SIGNIFICANT CHANGE UP (ref 5–17)
BASOPHILS # BLD AUTO: 0.02 K/UL — SIGNIFICANT CHANGE UP (ref 0–0.2)
BASOPHILS NFR BLD AUTO: 0.1 % — SIGNIFICANT CHANGE UP (ref 0–2)
BUN SERPL-MCNC: 19 MG/DL — HIGH (ref 7–18)
CALCIUM SERPL-MCNC: 9.5 MG/DL — SIGNIFICANT CHANGE UP (ref 8.4–10.5)
CHLORIDE SERPL-SCNC: 108 MMOL/L — SIGNIFICANT CHANGE UP (ref 96–108)
CO2 SERPL-SCNC: 18 MMOL/L — LOW (ref 22–31)
CREAT SERPL-MCNC: 1.71 MG/DL — HIGH (ref 0.5–1.3)
EOSINOPHIL # BLD AUTO: 0.18 K/UL — SIGNIFICANT CHANGE UP (ref 0–0.5)
EOSINOPHIL NFR BLD AUTO: 1.1 % — SIGNIFICANT CHANGE UP (ref 0–6)
GLUCOSE BLDC GLUCOMTR-MCNC: 138 MG/DL — HIGH (ref 70–99)
GLUCOSE BLDC GLUCOMTR-MCNC: 240 MG/DL — HIGH (ref 70–99)
GLUCOSE SERPL-MCNC: 158 MG/DL — HIGH (ref 70–99)
HCT VFR BLD CALC: 34.7 % — LOW (ref 39–50)
HGB BLD-MCNC: 11.8 G/DL — LOW (ref 13–17)
IMM GRANULOCYTES NFR BLD AUTO: 0.7 % — SIGNIFICANT CHANGE UP (ref 0–1.5)
LYMPHOCYTES # BLD AUTO: 1.54 K/UL — SIGNIFICANT CHANGE UP (ref 1–3.3)
LYMPHOCYTES # BLD AUTO: 9.2 % — LOW (ref 13–44)
MAGNESIUM SERPL-MCNC: 1.6 MG/DL — SIGNIFICANT CHANGE UP (ref 1.6–2.6)
MCHC RBC-ENTMCNC: 30.5 PG — SIGNIFICANT CHANGE UP (ref 27–34)
MCHC RBC-ENTMCNC: 34 GM/DL — SIGNIFICANT CHANGE UP (ref 32–36)
MCV RBC AUTO: 89.7 FL — SIGNIFICANT CHANGE UP (ref 80–100)
MONOCYTES # BLD AUTO: 1.7 K/UL — HIGH (ref 0–0.9)
MONOCYTES NFR BLD AUTO: 10.1 % — SIGNIFICANT CHANGE UP (ref 2–14)
NEUTROPHILS # BLD AUTO: 13.25 K/UL — HIGH (ref 1.8–7.4)
NEUTROPHILS NFR BLD AUTO: 78.8 % — HIGH (ref 43–77)
NRBC # BLD: 0 /100 WBCS — SIGNIFICANT CHANGE UP (ref 0–0)
PHOSPHATE SERPL-MCNC: 2 MG/DL — LOW (ref 2.5–4.5)
PLATELET # BLD AUTO: 291 K/UL — SIGNIFICANT CHANGE UP (ref 150–400)
POTASSIUM SERPL-MCNC: 3.2 MMOL/L — LOW (ref 3.5–5.3)
POTASSIUM SERPL-SCNC: 3.2 MMOL/L — LOW (ref 3.5–5.3)
RBC # BLD: 3.87 M/UL — LOW (ref 4.2–5.8)
RBC # FLD: 13.2 % — SIGNIFICANT CHANGE UP (ref 10.3–14.5)
SODIUM SERPL-SCNC: 136 MMOL/L — SIGNIFICANT CHANGE UP (ref 135–145)
T PALLIDUM AB TITR SER: NEGATIVE — SIGNIFICANT CHANGE UP
TSH SERPL-MCNC: 1.13 UU/ML — SIGNIFICANT CHANGE UP (ref 0.34–4.82)
VIT B12 SERPL-MCNC: 815 PG/ML — SIGNIFICANT CHANGE UP (ref 232–1245)
WBC # BLD: 16.8 K/UL — HIGH (ref 3.8–10.5)
WBC # FLD AUTO: 16.8 K/UL — HIGH (ref 3.8–10.5)

## 2019-05-01 PROCEDURE — 71250 CT THORAX DX C-: CPT | Mod: 26

## 2019-05-01 PROCEDURE — 70450 CT HEAD/BRAIN W/O DYE: CPT | Mod: 26

## 2019-05-01 PROCEDURE — 99233 SBSQ HOSP IP/OBS HIGH 50: CPT | Mod: GC

## 2019-05-01 PROCEDURE — 99232 SBSQ HOSP IP/OBS MODERATE 35: CPT

## 2019-05-01 PROCEDURE — 76775 US EXAM ABDO BACK WALL LIM: CPT | Mod: 26

## 2019-05-01 RX ORDER — SODIUM CHLORIDE 9 MG/ML
1000 INJECTION, SOLUTION INTRAVENOUS
Qty: 0 | Refills: 0 | Status: DISCONTINUED | OUTPATIENT
Start: 2019-05-01 | End: 2019-05-02

## 2019-05-01 RX ORDER — LANOLIN ALCOHOL/MO/W.PET/CERES
3 CREAM (GRAM) TOPICAL ONCE
Qty: 0 | Refills: 0 | Status: COMPLETED | OUTPATIENT
Start: 2019-05-01 | End: 2019-05-01

## 2019-05-01 RX ORDER — AMLODIPINE BESYLATE 2.5 MG/1
10 TABLET ORAL DAILY
Qty: 0 | Refills: 0 | Status: DISCONTINUED | OUTPATIENT
Start: 2019-05-01 | End: 2019-05-09

## 2019-05-01 RX ORDER — POTASSIUM PHOSPHATE, MONOBASIC POTASSIUM PHOSPHATE, DIBASIC 236; 224 MG/ML; MG/ML
15 INJECTION, SOLUTION INTRAVENOUS ONCE
Qty: 0 | Refills: 0 | Status: COMPLETED | OUTPATIENT
Start: 2019-05-01 | End: 2019-05-01

## 2019-05-01 RX ORDER — LANOLIN ALCOHOL/MO/W.PET/CERES
3 CREAM (GRAM) TOPICAL AT BEDTIME
Qty: 0 | Refills: 0 | Status: COMPLETED | OUTPATIENT
Start: 2019-05-01 | End: 2019-05-01

## 2019-05-01 RX ORDER — TAMSULOSIN HYDROCHLORIDE 0.4 MG/1
0.8 CAPSULE ORAL AT BEDTIME
Qty: 0 | Refills: 0 | Status: DISCONTINUED | OUTPATIENT
Start: 2019-05-02 | End: 2019-05-09

## 2019-05-01 RX ORDER — POTASSIUM CHLORIDE 20 MEQ
40 PACKET (EA) ORAL EVERY 4 HOURS
Qty: 0 | Refills: 0 | Status: DISCONTINUED | OUTPATIENT
Start: 2019-05-01 | End: 2019-05-01

## 2019-05-01 RX ORDER — TAMSULOSIN HYDROCHLORIDE 0.4 MG/1
0.8 CAPSULE ORAL ONCE
Qty: 0 | Refills: 0 | Status: COMPLETED | OUTPATIENT
Start: 2019-05-01 | End: 2019-05-01

## 2019-05-01 RX ORDER — TAMSULOSIN HYDROCHLORIDE 0.4 MG/1
0.4 CAPSULE ORAL ONCE
Qty: 0 | Refills: 0 | Status: DISCONTINUED | OUTPATIENT
Start: 2019-05-01 | End: 2019-05-01

## 2019-05-01 RX ADMIN — SODIUM CHLORIDE 75 MILLILITER(S): 9 INJECTION, SOLUTION INTRAVENOUS at 17:34

## 2019-05-01 RX ADMIN — Medication 75 MICROGRAM(S): at 06:18

## 2019-05-01 RX ADMIN — OLANZAPINE 5 MILLIGRAM(S): 15 TABLET, FILM COATED ORAL at 14:22

## 2019-05-01 RX ADMIN — Medication 1: at 08:19

## 2019-05-01 RX ADMIN — Medication 1 DROP(S): at 14:22

## 2019-05-01 RX ADMIN — AMLODIPINE BESYLATE 10 MILLIGRAM(S): 2.5 TABLET ORAL at 12:28

## 2019-05-01 RX ADMIN — AZITHROMYCIN 250 MILLIGRAM(S): 500 TABLET, FILM COATED ORAL at 17:32

## 2019-05-01 RX ADMIN — ATORVASTATIN CALCIUM 40 MILLIGRAM(S): 80 TABLET, FILM COATED ORAL at 21:35

## 2019-05-01 RX ADMIN — CEFTRIAXONE 100 GRAM(S): 500 INJECTION, POWDER, FOR SOLUTION INTRAMUSCULAR; INTRAVENOUS at 21:35

## 2019-05-01 RX ADMIN — TAMSULOSIN HYDROCHLORIDE 0.8 MILLIGRAM(S): 0.4 CAPSULE ORAL at 21:35

## 2019-05-01 RX ADMIN — POTASSIUM PHOSPHATE, MONOBASIC POTASSIUM PHOSPHATE, DIBASIC 62.5 MILLIMOLE(S): 236; 224 INJECTION, SOLUTION INTRAVENOUS at 12:04

## 2019-05-01 RX ADMIN — Medication 3 MILLILITER(S): at 09:13

## 2019-05-01 RX ADMIN — Medication 3 MILLIGRAM(S): at 23:59

## 2019-05-01 RX ADMIN — Medication 3 MILLILITER(S): at 03:01

## 2019-05-01 RX ADMIN — Medication 2: at 12:29

## 2019-05-01 RX ADMIN — HEPARIN SODIUM 5000 UNIT(S): 5000 INJECTION INTRAVENOUS; SUBCUTANEOUS at 06:19

## 2019-05-01 RX ADMIN — Medication 3 MILLILITER(S): at 21:41

## 2019-05-01 RX ADMIN — HEPARIN SODIUM 5000 UNIT(S): 5000 INJECTION INTRAVENOUS; SUBCUTANEOUS at 17:30

## 2019-05-01 NOTE — PROGRESS NOTE ADULT - ASSESSMENT
73 y/o M with PMHx of brown's esophagus, HTN, DM, HLD, and hypothyroidism came in due to weakness presents with encephalopathy WBC 28K and sepsis of unknown etiology. Empirically started on abx and leukocytosis is better now . Pt also with behavioral problems of restlessness and confusion. Pt with urinary retention and was placed on Jiménez cath.

## 2019-05-01 NOTE — PROGRESS NOTE ADULT - ASSESSMENT
74 year old male with history of Mcneill's esophagus presents with dysphagia and aspiration pneumonia. Doing well.     Plan:  Esophagram today   EGD tomorrow   NPO post midnight

## 2019-05-01 NOTE — PROGRESS NOTE ADULT - PROBLEM SELECTOR PLAN 7
dx 3 years ago via EGD  worsening difficulty to swallowing solids  S/S recommended Dysphagia mechanical soft   will order esophagogram and then will get EGD   Dr. Saldana GI

## 2019-05-01 NOTE — PROGRESS NOTE ADULT - ATTENDING COMMENTS
Patient was seen and examined by myself. Case was discussed with house staff in details. I have reviewed and agree with the plan as outlined above with edits where appropriate.    Vital Signs Last 24 Hrs  T(C): 36.7 (01 May 2019 14:00), Max: 36.7 (30 Apr 2019 19:00)  T(F): 98.1 (01 May 2019 14:00), Max: 98.1 (01 May 2019 14:00)  HR: 118 (01 May 2019 14:00) (100 - 132)  BP: 102/84 (01 May 2019 14:00) (102/84 - 170/89)  BP(mean): --  RR: 17 (01 May 2019 14:00) (17 - 19)  SpO2: 100% (01 May 2019 14:00) (96% - 100%)                          11.8   16.80 )-----------( 291      ( 01 May 2019 06:48 )             34.7       05-01    136  |  108  |  19<H>  ----------------------------<  158<H>  3.2<L>   |  18<L>  |  1.71<H>    Ca    9.5      01 May 2019 06:48  Phos  2.0     05-01  Mg     1.6     05-01        A/P 73 y/o M with   1. Leukocytosis  2. Acute renal failure possibly ATN  3. Lactic acidosis  4. Hypokalemia  5. Essential HTN  6. Urinary retention  7. Metabolic encephalopathy  8. Mcneill esophagus  9. dysphagia    Plan as outlined above  Avoid nephrotoxins  Obtain CT chest ( non contrast) to assess for consolidations  Trial of void  Fall precautions  Correct electrolyte derangement.

## 2019-05-01 NOTE — PROGRESS NOTE ADULT - SUBJECTIVE AND OBJECTIVE BOX
Subjective:   No new complaints     Objective:    MEDICATIONS  (STANDING):  ALBUTerol/ipratropium for Nebulization 3 milliLiter(s) Nebulizer every 6 hours  amLODIPine   Tablet 10 milliGRAM(s) Oral daily  artificial  tears Solution 1 Drop(s) Both EYES daily  atorvastatin 40 milliGRAM(s) Oral at bedtime  azithromycin  IVPB 500 milliGRAM(s) IV Intermittent every 24 hours  cefTRIAXone   IVPB 1 Gram(s) IV Intermittent every 24 hours  chlorhexidine 4% Liquid 1 Application(s) Topical every 12 hours  dextrose 5%. 1000 milliLiter(s) (50 mL/Hr) IV Continuous <Continuous>  dextrose 50% Injectable 12.5 Gram(s) IV Push once  dextrose 50% Injectable 25 Gram(s) IV Push once  dextrose 50% Injectable 25 Gram(s) IV Push once  heparin  Injectable 5000 Unit(s) SubCutaneous every 12 hours  influenza   Vaccine 0.5 milliLiter(s) IntraMuscular once  insulin lispro (HumaLOG) corrective regimen sliding scale   SubCutaneous three times a day before meals  lactated ringers. 1000 milliLiter(s) (75 mL/Hr) IV Continuous <Continuous>  levothyroxine 75 MICROGram(s) Oral daily  OLANZapine 5 milliGRAM(s) Oral daily  tamsulosin 0.4 milliGRAM(s) Oral at bedtime  tiotropium 18 MICROgram(s) Capsule 1 Capsule(s) Inhalation daily    MEDICATIONS  (PRN):  dextrose 40% Gel 15 Gram(s) Oral once PRN Blood Glucose LESS THAN 70 milliGRAM(s)/deciliter  glucagon  Injectable 1 milliGRAM(s) IntraMuscular once PRN Glucose LESS THAN 70 milligrams/deciliter  guaiFENesin   Syrup  (Sugar-Free) 100 milliGRAM(s) Oral every 6 hours PRN Cough              Vital Signs Last 24 Hrs  T(C): 36.7 (01 May 2019 14:00), Max: 36.7 (2019 19:00)  T(F): 98.1 (01 May 2019 14:00), Max: 98.1 (01 May 2019 14:00)  HR: 118 (01 May 2019 14:00) (93 - 132)  BP: 102/84 (01 May 2019 14:00) (102/84 - 170/89)  BP(mean): 85 (2019 18:00) (83 - 95)  RR: 17 (01 May 2019 14:00) (17 - 29)  SpO2: 100% (01 May 2019 14:00) (96% - 100%)      General:  Well developed, well nourished, alert and active, no pallor, NAD.  HEENT:    Normal appearance of conjunctiva, ears, nose, lips, oropharynx, and oral mucosa, anicteric.  Neck:  No masses, no asymmetry.  Lymph Nodes:  No lymphadenopathy.   Cardiovascular:  RRR normal S1/S2, no murmur.  Respiratory:  CTA B/L, normal respiratory effort.   Abdominal:   soft, no masses or tenderness, normoactive BS, NT/ND, no HSM.  Extremities:   No clubbing or cyanosis, normal capillary refill, no edema.   Skin:   No rash, jaundice, lesions, eczema.   Musculoskeletal:  No joint swelling, erythema or tenderness.   Neuro: No focal deficits.   Other:       LABS:                        11.8   16.80 )-----------( 291      ( 01 May 2019 06:48 )             34.7     05-01    136  |  108  |  19<H>  ----------------------------<  158<H>  3.2<L>   |  18<L>  |  1.71<H>    Ca    9.5      01 May 2019 06:48  Phos  2.0     05-  Mg     1.6     05-        Urinalysis Basic - ( 2019 16:38 )    Color: Yellow / Appearance: Clear / S.015 / pH: x  Gluc: x / Ketone: Negative  / Bili: Negative / Urobili: Negative   Blood: x / Protein: 100 / Nitrite: Negative   Leuk Esterase: Negative / RBC: 0-2 /HPF / WBC 0-2 /HPF   Sq Epi: x / Non Sq Epi: Few /HPF / Bacteria: Trace /HPF        RADIOLOGY & ADDITIONAL TESTS:

## 2019-05-01 NOTE — PROGRESS NOTE ADULT - PROBLEM SELECTOR PLAN 2
could be 2/2 sepsis and medications since pt was on losartan on HCTZ  unknown if pt is CKD   Cr is improving  will c/w IVF @ 75cc  holding ARB and HCTZ  US kidney pending   onitor BMP am

## 2019-05-01 NOTE — PROGRESS NOTE ADULT - PROBLEM SELECTOR PLAN 4
On losartan, HCTz, and amlodipine at home  will restart amlodipine for now since BP is stating to increase  once Cr stabilizes will restart Losartan

## 2019-05-01 NOTE — PROGRESS NOTE ADULT - SUBJECTIVE AND OBJECTIVE BOX
Patient is a 74y old  Male who presents with dysphagia, encephalopathy, WBC 28K and sepsis of unknown etiology. Empirically started on abx and leukocytosis is better now . Pt also with behavioral problems of restlessness and confusion. Pt with urinary retention and was placed on Jiménez cath. Pt will need esophagogram  and EGD for dysphagia evaluation       INTERVAL HPI/OVERNIGHT EVENTS:    very confused overnight and was given haldol     MEDICATIONS  (STANDING):  ALBUTerol/ipratropium for Nebulization 3 milliLiter(s) Nebulizer every 6 hours  artificial  tears Solution 1 Drop(s) Both EYES daily  atorvastatin 40 milliGRAM(s) Oral at bedtime  azithromycin  IVPB 500 milliGRAM(s) IV Intermittent every 24 hours  cefTRIAXone   IVPB 1 Gram(s) IV Intermittent every 24 hours  chlorhexidine 4% Liquid 1 Application(s) Topical every 12 hours  dextrose 5%. 1000 milliLiter(s) (50 mL/Hr) IV Continuous <Continuous>  dextrose 50% Injectable 12.5 Gram(s) IV Push once  dextrose 50% Injectable 25 Gram(s) IV Push once  dextrose 50% Injectable 25 Gram(s) IV Push once  heparin  Injectable 5000 Unit(s) SubCutaneous every 12 hours  influenza   Vaccine 0.5 milliLiter(s) IntraMuscular once  insulin lispro (HumaLOG) corrective regimen sliding scale   SubCutaneous three times a day before meals  lactated ringers. 1000 milliLiter(s) (75 mL/Hr) IV Continuous <Continuous>  levothyroxine 75 MICROGram(s) Oral daily  OLANZapine 5 milliGRAM(s) Oral daily  potassium phosphate IVPB 15 milliMole(s) IV Intermittent once  tamsulosin 0.4 milliGRAM(s) Oral at bedtime  tiotropium 18 MICROgram(s) Capsule 1 Capsule(s) Inhalation daily    MEDICATIONS  (PRN):  dextrose 40% Gel 15 Gram(s) Oral once PRN Blood Glucose LESS THAN 70 milliGRAM(s)/deciliter  glucagon  Injectable 1 milliGRAM(s) IntraMuscular once PRN Glucose LESS THAN 70 milligrams/deciliter  guaiFENesin   Syrup  (Sugar-Free) 100 milliGRAM(s) Oral every 6 hours PRN Cough      Allergies    penicillin (Rash)    Intolerances        Vital Signs Last 24 Hrs  T(C): 36.5 (01 May 2019 06:04), Max: 36.9 (2019 12:00)  T(F): 97.7 (01 May 2019 06:04), Max: 98.5 (2019 12:00)  HR: 101 (01 May 2019 06:41) (89 - 120)  BP: 152/80 (01 May 2019 06:41) (115/74 - 170/89)  BP(mean): 85 (2019 18:00) (76 - 95)  RR: 19 (01 May 2019 06:04) (16 - 30)  SpO2: 97% (01 May 2019 06:04) (96% - 99%)    PHYSICAL EXAM:  GENERAL: NAD, confused   HEENT: Supple, No JVD, Normal thyroid  NERVOUS SYSTEM:  Alert & Oriented X1,   CHEST/LUNG: Clear to percussion bilaterally; No rales, rhonchi, wheezing, or rubs  HEART: Regular rate and rhythm; No murmurs, rubs, or gallops  ABDOMEN: Soft, Nontender, Nondistended; Bowel sounds present  EXTREMITIES:  2+ Peripheral Pulses, No clubbing, cyanosis, or edema  SKIN: no rashes      LABS:                        11.8   16.80 )-----------( 291      ( 01 May 2019 06:48 )             34.7     05-01    136  |  108  |  19<H>  ----------------------------<  158<H>  3.2<L>   |  18<L>  |  1.71<H>    Ca    9.5      01 May 2019 06:48  Phos  2.0     05-  Mg     1.6     05-    TPro  8.2  /  Alb  2.5<L>  /  TBili  0.4  /  DBili  x   /  AST  29  /  ALT  33  /  AlkPhos  148<H>  04-29    PT/INR - ( 2019 12:32 )   PT: 12.4 sec;   INR: 1.11 ratio         PTT - ( 2019 12:32 )  PTT:30.4 sec  Urinalysis Basic - ( 2019 16:38 )    Color: Yellow / Appearance: Clear / S.015 / pH: x  Gluc: x / Ketone: Negative  / Bili: Negative / Urobili: Negative   Blood: x / Protein: 100 / Nitrite: Negative   Leuk Esterase: Negative / RBC: 0-2 /HPF / WBC 0-2 /HPF   Sq Epi: x / Non Sq Epi: Few /HPF / Bacteria: Trace /HPF      CAPILLARY BLOOD GLUCOSE      POCT Blood Glucose.: 152 mg/dL (01 May 2019 08:12)  POCT Blood Glucose.: 135 mg/dL (2019 16:32)  POCT Blood Glucose.: 162 mg/dL (2019 10:46)      RADIOLOGY & ADDITIONAL TESTS:

## 2019-05-01 NOTE — PROGRESS NOTE ADULT - PROBLEM SELECTOR PLAN 1
Leukocytosis, improving and afebrile overnight   lactic acidosis of 10 which resolved, metformin could be a cause   UA negative  CXR  negative   on Rocephin and azithromycin D3 empirically   blood cx negative  Still encephalopathic, could be due to worsening dementia--> Psych consult

## 2019-05-02 DIAGNOSIS — F41.1 GENERALIZED ANXIETY DISORDER: ICD-10-CM

## 2019-05-02 DIAGNOSIS — F39 UNSPECIFIED MOOD [AFFECTIVE] DISORDER: ICD-10-CM

## 2019-05-02 LAB
ANION GAP SERPL CALC-SCNC: 8 MMOL/L — SIGNIFICANT CHANGE UP (ref 5–17)
BASOPHILS # BLD AUTO: 0.03 K/UL — SIGNIFICANT CHANGE UP (ref 0–0.2)
BASOPHILS NFR BLD AUTO: 0.3 % — SIGNIFICANT CHANGE UP (ref 0–2)
BUN SERPL-MCNC: 13 MG/DL — SIGNIFICANT CHANGE UP (ref 7–18)
CALCIUM SERPL-MCNC: 8.4 MG/DL — SIGNIFICANT CHANGE UP (ref 8.4–10.5)
CHLORIDE SERPL-SCNC: 109 MMOL/L — HIGH (ref 96–108)
CO2 SERPL-SCNC: 22 MMOL/L — SIGNIFICANT CHANGE UP (ref 22–31)
CREAT SERPL-MCNC: 1.53 MG/DL — HIGH (ref 0.5–1.3)
EOSINOPHIL # BLD AUTO: 0.24 K/UL — SIGNIFICANT CHANGE UP (ref 0–0.5)
EOSINOPHIL NFR BLD AUTO: 2.1 % — SIGNIFICANT CHANGE UP (ref 0–6)
GLUCOSE SERPL-MCNC: 324 MG/DL — HIGH (ref 70–99)
HCT VFR BLD CALC: 29.3 % — LOW (ref 39–50)
HGB BLD-MCNC: 9.8 G/DL — LOW (ref 13–17)
IMM GRANULOCYTES NFR BLD AUTO: 0.4 % — SIGNIFICANT CHANGE UP (ref 0–1.5)
LYMPHOCYTES # BLD AUTO: 1.59 K/UL — SIGNIFICANT CHANGE UP (ref 1–3.3)
LYMPHOCYTES # BLD AUTO: 14.2 % — SIGNIFICANT CHANGE UP (ref 13–44)
MAGNESIUM SERPL-MCNC: 1.6 MG/DL — SIGNIFICANT CHANGE UP (ref 1.6–2.6)
MCHC RBC-ENTMCNC: 29.9 PG — SIGNIFICANT CHANGE UP (ref 27–34)
MCHC RBC-ENTMCNC: 33.4 GM/DL — SIGNIFICANT CHANGE UP (ref 32–36)
MCV RBC AUTO: 89.3 FL — SIGNIFICANT CHANGE UP (ref 80–100)
MONOCYTES # BLD AUTO: 1.15 K/UL — HIGH (ref 0–0.9)
MONOCYTES NFR BLD AUTO: 10.3 % — SIGNIFICANT CHANGE UP (ref 2–14)
NEUTROPHILS # BLD AUTO: 8.14 K/UL — HIGH (ref 1.8–7.4)
NEUTROPHILS NFR BLD AUTO: 72.7 % — SIGNIFICANT CHANGE UP (ref 43–77)
NRBC # BLD: 0 /100 WBCS — SIGNIFICANT CHANGE UP (ref 0–0)
PHOSPHATE SERPL-MCNC: 2.5 MG/DL — SIGNIFICANT CHANGE UP (ref 2.5–4.5)
PLATELET # BLD AUTO: 252 K/UL — SIGNIFICANT CHANGE UP (ref 150–400)
POTASSIUM SERPL-MCNC: 3.1 MMOL/L — LOW (ref 3.5–5.3)
POTASSIUM SERPL-SCNC: 3.1 MMOL/L — LOW (ref 3.5–5.3)
RBC # BLD: 3.28 M/UL — LOW (ref 4.2–5.8)
RBC # FLD: 13.4 % — SIGNIFICANT CHANGE UP (ref 10.3–14.5)
SODIUM SERPL-SCNC: 139 MMOL/L — SIGNIFICANT CHANGE UP (ref 135–145)
WBC # BLD: 11.19 K/UL — HIGH (ref 3.8–10.5)
WBC # FLD AUTO: 11.19 K/UL — HIGH (ref 3.8–10.5)

## 2019-05-02 PROCEDURE — 74220 X-RAY XM ESOPHAGUS 1CNTRST: CPT | Mod: 26

## 2019-05-02 PROCEDURE — 99233 SBSQ HOSP IP/OBS HIGH 50: CPT | Mod: GC

## 2019-05-02 PROCEDURE — 99232 SBSQ HOSP IP/OBS MODERATE 35: CPT

## 2019-05-02 RX ORDER — SODIUM CHLORIDE 9 MG/ML
1000 INJECTION INTRAMUSCULAR; INTRAVENOUS; SUBCUTANEOUS
Qty: 0 | Refills: 0 | Status: COMPLETED | OUTPATIENT
Start: 2019-05-02 | End: 2019-05-04

## 2019-05-02 RX ORDER — METOPROLOL TARTRATE 50 MG
25 TABLET ORAL
Qty: 0 | Refills: 0 | Status: DISCONTINUED | OUTPATIENT
Start: 2019-05-02 | End: 2019-05-09

## 2019-05-02 RX ORDER — SODIUM CHLORIDE 9 MG/ML
1000 INJECTION INTRAMUSCULAR; INTRAVENOUS; SUBCUTANEOUS ONCE
Qty: 0 | Refills: 0 | Status: DISCONTINUED | OUTPATIENT
Start: 2019-05-02 | End: 2019-05-02

## 2019-05-02 RX ORDER — LANOLIN ALCOHOL/MO/W.PET/CERES
3 CREAM (GRAM) TOPICAL ONCE
Qty: 0 | Refills: 0 | Status: COMPLETED | OUTPATIENT
Start: 2019-05-02 | End: 2019-05-02

## 2019-05-02 RX ADMIN — Medication 3 MILLILITER(S): at 09:24

## 2019-05-02 RX ADMIN — Medication 1 DROP(S): at 12:05

## 2019-05-02 RX ADMIN — Medication 25 MILLIGRAM(S): at 15:14

## 2019-05-02 RX ADMIN — Medication 75 MICROGRAM(S): at 05:57

## 2019-05-02 RX ADMIN — Medication 3 MILLILITER(S): at 03:42

## 2019-05-02 RX ADMIN — Medication 2: at 17:13

## 2019-05-02 RX ADMIN — HEPARIN SODIUM 5000 UNIT(S): 5000 INJECTION INTRAVENOUS; SUBCUTANEOUS at 17:13

## 2019-05-02 RX ADMIN — AMLODIPINE BESYLATE 10 MILLIGRAM(S): 2.5 TABLET ORAL at 05:57

## 2019-05-02 RX ADMIN — Medication 3 MILLILITER(S): at 20:54

## 2019-05-02 RX ADMIN — Medication 3 MILLILITER(S): at 15:38

## 2019-05-02 RX ADMIN — ATORVASTATIN CALCIUM 40 MILLIGRAM(S): 80 TABLET, FILM COATED ORAL at 21:35

## 2019-05-02 RX ADMIN — Medication 2: at 12:05

## 2019-05-02 RX ADMIN — OLANZAPINE 5 MILLIGRAM(S): 15 TABLET, FILM COATED ORAL at 12:05

## 2019-05-02 RX ADMIN — Medication 3 MILLIGRAM(S): at 21:34

## 2019-05-02 RX ADMIN — Medication 1: at 08:29

## 2019-05-02 RX ADMIN — HEPARIN SODIUM 5000 UNIT(S): 5000 INJECTION INTRAVENOUS; SUBCUTANEOUS at 05:57

## 2019-05-02 RX ADMIN — TAMSULOSIN HYDROCHLORIDE 0.8 MILLIGRAM(S): 0.4 CAPSULE ORAL at 21:35

## 2019-05-02 NOTE — PHYSICAL THERAPY INITIAL EVALUATION ADULT - CRITERIA FOR SKILLED THERAPEUTIC INTERVENTIONS
functional limitations in following categories/predicted duration of therapy intervention/rehab potential/risk reduction/prevention/anticipated discharge recommendation/impairments found/therapy frequency

## 2019-05-02 NOTE — PHYSICAL THERAPY INITIAL EVALUATION ADULT - GENERAL OBSERVATIONS, REHAB EVAL
Pt. found lying supine; on O2 via NC at 2L. Pt. mildly confused and is on enhanced supervision, Pt. cooperative during eval.

## 2019-05-02 NOTE — BEHAVIORAL HEALTH ASSESSMENT NOTE - NSBHCHARTREVIEWVS_PSY_A_CORE FT
Vital Signs Last 24 Hrs  T(C): 37.1 (02 May 2019 05:00), Max: 37.1 (02 May 2019 05:00)  T(F): 98.7 (02 May 2019 05:00), Max: 98.7 (02 May 2019 05:00)  HR: 101 (02 May 2019 05:00) (94 - 132)  BP: 134/63 (02 May 2019 05:00) (102/84 - 138/66)  BP(mean): --  RR: 16 (02 May 2019 05:00) (16 - 18)  SpO2: 98% (02 May 2019 05:00) (98% - 100%)

## 2019-05-02 NOTE — PROGRESS NOTE ADULT - PROBLEM SELECTOR PLAN 2
could be 2/2 sepsis and medications since pt was on losartan on HCTZ  unknown if pt is CKD   Cr is improving  will c/w IVF @ 80cc  holding ARB and HCTZ  US kidney with CKD findings     RASHAD am

## 2019-05-02 NOTE — PROGRESS NOTE ADULT - PROBLEM SELECTOR PLAN 7
dx 3 years ago via EGD  worsening difficulty to swallowing solids  S/S recommended Dysphagia mechanical soft   will get  esophagogram today will get EGD   Dr. Saldana GI

## 2019-05-02 NOTE — PROGRESS NOTE ADULT - ATTENDING COMMENTS
Patient was seen and examined by myself with PGY3. Case was discussed with house staff in details. I have reviewed and agree with the plan as outlined above with edits where appropriate.    Vital Signs Last 24 Hrs  T(C): 36.6 (02 May 2019 13:57), Max: 37.1 (02 May 2019 05:00)  T(F): 97.8 (02 May 2019 13:57), Max: 98.7 (02 May 2019 05:00)  HR: 88 (02 May 2019 15:45) (85 - 105)  BP: 145/70 (02 May 2019 13:57) (134/63 - 145/70)  BP(mean): --  RR: 18 (02 May 2019 13:57) (16 - 18)  SpO2: 97% (02 May 2019 15:45) (94% - 99%)    A/P 75 y/o M with   1. Leukocytosis- no source of infection identified. CT chest without evidence of PNA; discontinued antibiotics. Unlikely sepsis on admission.   2. Acute renal failure possibly ATN  3. Lactic acidosis presumed due to dehydration  4. Hypokalemia  5. Essential HTN  6. Urinary retention  7. Metabolic encephalopathy- resolving  8. Mcneill esophagus  9. dysphagia    Follow up esophagram  Will need EGD possibly in am  GI follow up   Continue PPI  concern for malignancy  Other plan as outlined above.

## 2019-05-02 NOTE — BEHAVIORAL HEALTH ASSESSMENT NOTE - NSBHCHARTREVIEWLAB_PSY_A_CORE FT
05-02    139  |  109<H>  |  13  ----------------------------<  324<H>  3.1<L>   |  22  |  1.53<H>    Ca    8.4      02 May 2019 06:32  Phos  2.5     05-02  Mg     1.6     05-02

## 2019-05-02 NOTE — PROGRESS NOTE ADULT - PROBLEM SELECTOR PLAN 4
On losartan, HCTz, and amlodipine at home  restarted on amlodipine  yesterday   will start lopressor 25 mg BID since HR is on the high side.   once Cr stabilizes will restart Losartan

## 2019-05-02 NOTE — BEHAVIORAL HEALTH ASSESSMENT NOTE - SUMMARY
This is a 73 y/o  ,retired,w/m with h/o Anxiety.depression ,DM,HTN,HLD was admitted with dehydration,BLANCHE.  Patient was interviewed,chart was reviewed,case was discussed with MD.  Patient is c/o constant anxiety,poor night sleep,sadness.  Patient has been taking Zyprexa 5mg po qhs and Xanax 0.25mg PO PRN BID for many years.  Denied h/o inpatient psych TX.  Patient is a/o x3.cooperative verbal,behaviorally controlled.Speech is goal directed logical.  Mood-"anxious/depressed".Denied s/h thought.Denied a/v hallucinations.Memory and cognition-fair.I&J-fair.

## 2019-05-02 NOTE — PROGRESS NOTE ADULT - PROBLEM SELECTOR PLAN 1
Leukocytosis still improving  afebrile overnight   CT chest negative for acute findings. No source of infection. Septic like picture most likely to severe dehydration.--> c/w IVF  lactic acidosis of 10 which resolved, metformin could also  be a cause   UA negative  CXR negative   blood cx negative  Encephalopathy better today, more cooperative today. Zyprexa increase  PT re eval   dc planning

## 2019-05-02 NOTE — PROGRESS NOTE ADULT - SUBJECTIVE AND OBJECTIVE BOX
Patient is a 74y old  Male who presents with dysphagia, encephalopathy, WBC 28K and sepsis of unknown etiology. Empirically started on abx and leukocytosis is better now. Chest Ct negative for infection and antibiotics were dced.  Pt also with behavioral problems of restlessness and confusion. Mike evaluated patient and recommended increase Zyprexa.  Pt with urinary retention and was placed on Jiménez cath which then later passed TOV.. Pt will need esophagogram  and EGD for dysphagia evaluation.       INTERVAL HPI/OVERNIGHT EVENTS:    calm today, no restlessness     MEDICATIONS  (STANDING):  ALBUTerol/ipratropium for Nebulization 3 milliLiter(s) Nebulizer every 6 hours  amLODIPine   Tablet 10 milliGRAM(s) Oral daily  artificial  tears Solution 1 Drop(s) Both EYES daily  atorvastatin 40 milliGRAM(s) Oral at bedtime  cefTRIAXone   IVPB 1 Gram(s) IV Intermittent every 24 hours  chlorhexidine 4% Liquid 1 Application(s) Topical every 12 hours  dextrose 5%. 1000 milliLiter(s) (50 mL/Hr) IV Continuous <Continuous>  dextrose 50% Injectable 12.5 Gram(s) IV Push once  dextrose 50% Injectable 25 Gram(s) IV Push once  dextrose 50% Injectable 25 Gram(s) IV Push once  heparin  Injectable 5000 Unit(s) SubCutaneous every 12 hours  influenza   Vaccine 0.5 milliLiter(s) IntraMuscular once  insulin lispro (HumaLOG) corrective regimen sliding scale   SubCutaneous three times a day before meals  lactated ringers. 1000 milliLiter(s) (75 mL/Hr) IV Continuous <Continuous>  levothyroxine 75 MICROGram(s) Oral daily  OLANZapine 5 milliGRAM(s) Oral daily  tamsulosin 0.8 milliGRAM(s) Oral at bedtime  tiotropium 18 MICROgram(s) Capsule 1 Capsule(s) Inhalation daily    MEDICATIONS  (PRN):  dextrose 40% Gel 15 Gram(s) Oral once PRN Blood Glucose LESS THAN 70 milliGRAM(s)/deciliter  glucagon  Injectable 1 milliGRAM(s) IntraMuscular once PRN Glucose LESS THAN 70 milligrams/deciliter  guaiFENesin   Syrup  (Sugar-Free) 100 milliGRAM(s) Oral every 6 hours PRN Cough      Allergies    penicillin (Rash)    Intolerances        Vital Signs Last 24 Hrs  T(C): 36.4 (02 May 2019 09:24), Max: 37.1 (02 May 2019 05:00)  T(F): 97.5 (02 May 2019 09:24), Max: 98.7 (02 May 2019 05:00)  HR: 105 (02 May 2019 11:15) (92 - 132)  BP: 142/68 (02 May 2019 11:15) (102/84 - 142/68)  BP(mean): --  RR: 18 (02 May 2019 09:24) (16 - 18)  SpO2: 97% (02 May 2019 11:15) (94% - 100%)    PHYSICAL EXAM:  GENERAL: NAD, confused   HEENT: Supple, No JVD, Normal thyroid  NERVOUS SYSTEM:  Alert & Oriented X2, no focal deficits   CHEST/LUNG: Clear to percussion bilaterally; No rales, rhonchi, wheezing, or rubs  HEART: Regular rate and rhythm; No murmurs, rubs, or gallops  ABDOMEN: Soft, Nontender, Nondistended; Bowel sounds present  EXTREMITIES:  2+ Peripheral Pulses, No clubbing, cyanosis, or edema  SKIN: no rashes          LABS:                        9.8    11.19 )-----------( 252      ( 02 May 2019 06:32 )             29.3     05-02    139  |  109<H>  |  13  ----------------------------<  324<H>  3.1<L>   |  22  |  1.53<H>    Ca    8.4      02 May 2019 06:32  Phos  2.5     05-02  Mg     1.6     05-02          CAPILLARY BLOOD GLUCOSE      POCT Blood Glucose.: 215 mg/dL (02 May 2019 11:51)  POCT Blood Glucose.: 196 mg/dL (02 May 2019 07:49)  POCT Blood Glucose.: 179 mg/dL (01 May 2019 21:04)  POCT Blood Glucose.: 138 mg/dL (01 May 2019 16:37)      RADIOLOGY & ADDITIONAL TESTS:

## 2019-05-03 LAB
ANION GAP SERPL CALC-SCNC: 9 MMOL/L — SIGNIFICANT CHANGE UP (ref 5–17)
BUN SERPL-MCNC: 10 MG/DL — SIGNIFICANT CHANGE UP (ref 7–18)
CALCIUM SERPL-MCNC: 8.4 MG/DL — SIGNIFICANT CHANGE UP (ref 8.4–10.5)
CHLORIDE SERPL-SCNC: 110 MMOL/L — HIGH (ref 96–108)
CO2 SERPL-SCNC: 22 MMOL/L — SIGNIFICANT CHANGE UP (ref 22–31)
CREAT SERPL-MCNC: 1.53 MG/DL — HIGH (ref 0.5–1.3)
GLUCOSE BLDC GLUCOMTR-MCNC: 149 MG/DL — HIGH (ref 70–99)
GLUCOSE BLDC GLUCOMTR-MCNC: 154 MG/DL — HIGH (ref 70–99)
GLUCOSE BLDC GLUCOMTR-MCNC: 190 MG/DL — HIGH (ref 70–99)
GLUCOSE BLDC GLUCOMTR-MCNC: 272 MG/DL — HIGH (ref 70–99)
GLUCOSE SERPL-MCNC: 160 MG/DL — HIGH (ref 70–99)
HCT VFR BLD CALC: 31 % — LOW (ref 39–50)
HGB BLD-MCNC: 10.2 G/DL — LOW (ref 13–17)
MAGNESIUM SERPL-MCNC: 1.6 MG/DL — SIGNIFICANT CHANGE UP (ref 1.6–2.6)
MCHC RBC-ENTMCNC: 29.7 PG — SIGNIFICANT CHANGE UP (ref 27–34)
MCHC RBC-ENTMCNC: 32.9 GM/DL — SIGNIFICANT CHANGE UP (ref 32–36)
MCV RBC AUTO: 90.4 FL — SIGNIFICANT CHANGE UP (ref 80–100)
NRBC # BLD: 0 /100 WBCS — SIGNIFICANT CHANGE UP (ref 0–0)
PHOSPHATE SERPL-MCNC: 2.4 MG/DL — LOW (ref 2.5–4.5)
PLATELET # BLD AUTO: 256 K/UL — SIGNIFICANT CHANGE UP (ref 150–400)
POTASSIUM SERPL-MCNC: 3.2 MMOL/L — LOW (ref 3.5–5.3)
POTASSIUM SERPL-SCNC: 3.2 MMOL/L — LOW (ref 3.5–5.3)
RBC # BLD: 3.43 M/UL — LOW (ref 4.2–5.8)
RBC # FLD: 13.6 % — SIGNIFICANT CHANGE UP (ref 10.3–14.5)
SODIUM SERPL-SCNC: 141 MMOL/L — SIGNIFICANT CHANGE UP (ref 135–145)
WBC # BLD: 10.89 K/UL — HIGH (ref 3.8–10.5)
WBC # FLD AUTO: 10.89 K/UL — HIGH (ref 3.8–10.5)

## 2019-05-03 PROCEDURE — 99233 SBSQ HOSP IP/OBS HIGH 50: CPT | Mod: GC

## 2019-05-03 RX ORDER — SODIUM,POTASSIUM PHOSPHATES 278-250MG
1 POWDER IN PACKET (EA) ORAL
Qty: 0 | Refills: 0 | Status: COMPLETED | OUTPATIENT
Start: 2019-05-03 | End: 2019-05-04

## 2019-05-03 RX ORDER — INSULIN LISPRO 100/ML
4 VIAL (ML) SUBCUTANEOUS ONCE
Qty: 0 | Refills: 0 | Status: COMPLETED | OUTPATIENT
Start: 2019-05-03 | End: 2019-05-03

## 2019-05-03 RX ADMIN — OLANZAPINE 5 MILLIGRAM(S): 15 TABLET, FILM COATED ORAL at 13:03

## 2019-05-03 RX ADMIN — Medication 4 UNIT(S): at 21:42

## 2019-05-03 RX ADMIN — Medication 3 MILLILITER(S): at 08:42

## 2019-05-03 RX ADMIN — Medication 3 MILLILITER(S): at 02:13

## 2019-05-03 RX ADMIN — Medication 3 MILLILITER(S): at 20:10

## 2019-05-03 RX ADMIN — TAMSULOSIN HYDROCHLORIDE 0.8 MILLIGRAM(S): 0.4 CAPSULE ORAL at 21:42

## 2019-05-03 RX ADMIN — Medication 3 MILLILITER(S): at 15:26

## 2019-05-03 RX ADMIN — Medication 1 DROP(S): at 13:03

## 2019-05-03 RX ADMIN — Medication 1: at 08:15

## 2019-05-03 RX ADMIN — Medication 1 TABLET(S): at 21:42

## 2019-05-03 RX ADMIN — HEPARIN SODIUM 5000 UNIT(S): 5000 INJECTION INTRAVENOUS; SUBCUTANEOUS at 17:25

## 2019-05-03 RX ADMIN — Medication 1: at 17:24

## 2019-05-03 RX ADMIN — ATORVASTATIN CALCIUM 40 MILLIGRAM(S): 80 TABLET, FILM COATED ORAL at 21:42

## 2019-05-03 NOTE — PROGRESS NOTE ADULT - PROBLEM SELECTOR PLAN 2
Leukocytosis still improving  afebrile overnight   No source of infection was found and abx were dced  Septic like picture most likely to severe dehydration.  blood cx negative  PT recommended rehab, will give choices to patient and wife  Discussed at bedside with wife.

## 2019-05-03 NOTE — PROGRESS NOTE ADULT - PROBLEM SELECTOR PLAN 3
stable   could be 2/2 sepsis and medications since pt was on losartan on HCTZ  unknown if pt is CKD   s/p  IVF   holding ARB and HCTZ  US kidney with CKD findings

## 2019-05-03 NOTE — PROGRESS NOTE ADULT - ATTENDING COMMENTS
Patient was seen and examined by myself. Case was discussed with house staff in details. I have reviewed and agree with the plan as outlined above with edits where appropriate.    Vital Signs Last 24 Hrs  T(C): 37.1 (03 May 2019 13:57), Max: 37.4 (02 May 2019 20:00)  T(F): 98.7 (03 May 2019 13:57), Max: 99.3 (02 May 2019 20:00)  HR: 89 (03 May 2019 16:41) (89 - 101)  BP: 106/58 (03 May 2019 13:57) (106/58 - 140/72)  BP(mean): --  RR: 17 (03 May 2019 13:57) (17 - 18)  SpO2: 99% (03 May 2019 16:41) (92% - 99%)                        10.2   10.89 )-----------( 256      ( 03 May 2019 06:24 )             31.0     05-03    141  |  110<H>  |  10  ----------------------------<  160<H>  3.2<L>   |  22  |  1.53<H>    Ca    8.4      03 May 2019 06:24  Phos  2.4     05-03  Mg     1.6     05-03      A/P: Patient was seen and examined by myself. Case was discussed with house staff in details. I have reviewed and agree with the plan as outlined above with edits where appropriate.    Vital Signs Last 24 Hrs  T(C): 37.1 (03 May 2019 13:57), Max: 37.4 (02 May 2019 20:00)  T(F): 98.7 (03 May 2019 13:57), Max: 99.3 (02 May 2019 20:00)  HR: 89 (03 May 2019 16:41) (89 - 101)  BP: 106/58 (03 May 2019 13:57) (106/58 - 140/72)  BP(mean): --  RR: 17 (03 May 2019 13:57) (17 - 18)  SpO2: 99% (03 May 2019 16:41) (92% - 99%)                        10.2   10.89 )-----------( 256      ( 03 May 2019 06:24 )             31.0     05-03    141  |  110<H>  |  10  ----------------------------<  160<H>  3.2<L>   |  22  |  1.53<H>    Ca    8.4      03 May 2019 06:24  Phos  2.4     05-03  Mg     1.6     05-03        A/P: 75 y/o M with   1.  Acute renal failure possibly ATN  2. esophageail thickening with abnormal esophagram- needs EGD  3. Lactic acidosis presumed due to dehydration  4. Hypokalemia  5. Essential HTN  6. Urinary retention- resolved  7. Metabolic encephalopathy- resolving  8. Mcneill esophagus  9. dysphagia  10 Leukocytosis- no source of infection identified. CT chest without evidence of PNA; discontinued antibiotics. Unlikely sepsis on admission.     plan for EGD on Monday  Supportive measures  Correct hypokalemia  other plan as outlined above  PT and exercise for mobility

## 2019-05-03 NOTE — PROGRESS NOTE ADULT - SUBJECTIVE AND OBJECTIVE BOX
Patient is a 74y old  Male who presents with dysphagia, encephalopathy, WBC 28K and sepsis of unknown etiology. Empirically started on abx and leukocytosis is better now. Chest Ct negative for infection and antibiotics were dced.  Pt also with behavioral problems of restlessness and confusion. Mike evaluated patient and recommended increase Zyprexa. Doing much better today in the mood aspect.  Pt with urinary retention and was placed on Jiménez cath which then later passed TOV.. Pt had esophagogram showing Filling defects in the mid esophagus with irregular mucosal contour. Pt will get  EGD for dysphagia evaluation on Monday     INTERVAL HPI/OVERNIGHT EVENTS:    doing well, no complaints comfortable in bed    MEDICATIONS  (STANDING):  ALBUTerol/ipratropium for Nebulization 3 milliLiter(s) Nebulizer every 6 hours  amLODIPine   Tablet 10 milliGRAM(s) Oral daily  artificial  tears Solution 1 Drop(s) Both EYES daily  atorvastatin 40 milliGRAM(s) Oral at bedtime  dextrose 5%. 1000 milliLiter(s) (50 mL/Hr) IV Continuous <Continuous>  dextrose 50% Injectable 12.5 Gram(s) IV Push once  dextrose 50% Injectable 25 Gram(s) IV Push once  dextrose 50% Injectable 25 Gram(s) IV Push once  heparin  Injectable 5000 Unit(s) SubCutaneous every 12 hours  influenza   Vaccine 0.5 milliLiter(s) IntraMuscular once  insulin lispro (HumaLOG) corrective regimen sliding scale   SubCutaneous three times a day before meals  levothyroxine 75 MICROGram(s) Oral daily  metoprolol tartrate 25 milliGRAM(s) Oral two times a day  OLANZapine 5 milliGRAM(s) Oral daily  sodium chloride 0.9%. 1000 milliLiter(s) (80 mL/Hr) IV Continuous <Continuous>  tamsulosin 0.8 milliGRAM(s) Oral at bedtime  tiotropium 18 MICROgram(s) Capsule 1 Capsule(s) Inhalation daily    MEDICATIONS  (PRN):  dextrose 40% Gel 15 Gram(s) Oral once PRN Blood Glucose LESS THAN 70 milliGRAM(s)/deciliter  glucagon  Injectable 1 milliGRAM(s) IntraMuscular once PRN Glucose LESS THAN 70 milligrams/deciliter  guaiFENesin   Syrup  (Sugar-Free) 100 milliGRAM(s) Oral every 6 hours PRN Cough      Allergies    penicillin (Rash)    Intolerances        Vital Signs Last 24 Hrs  T(C): 36.9 (03 May 2019 04:45), Max: 37.4 (02 May 2019 20:00)  T(F): 98.5 (03 May 2019 04:45), Max: 99.3 (02 May 2019 20:00)  HR: 91 (03 May 2019 10:17) (85 - 101)  BP: 130/62 (03 May 2019 05:45) (130/57 - 145/70)  BP(mean): --  RR: 18 (03 May 2019 04:45) (17 - 18)  SpO2: 98% (03 May 2019 10:17) (92% - 98%)    PHYSICAL EXAM:  GENERAL: NAD, confused   HEENT: Supple, No JVD, Normal thyroid  NERVOUS SYSTEM:  Alert & Oriented X2, no focal deficits   CHEST/LUNG: Clear to percussion bilaterally; No rales, rhonchi, wheezing, or rubs  HEART: Regular rate and rhythm; No murmurs, rubs, or gallops  ABDOMEN: Soft, Nontender, Nondistended; Bowel sounds present  EXTREMITIES:  2+ Peripheral Pulses, No clubbing, cyanosis, or edema  SKIN: no rashes          LABS:                        10.2   10.89 )-----------( 256      ( 03 May 2019 06:24 )             31.0     05-03    141  |  110<H>  |  10  ----------------------------<  160<H>  3.2<L>   |  22  |  1.53<H>    Ca    8.4      03 May 2019 06:24  Phos  2.4     05-03  Mg     1.6     05-03          CAPILLARY BLOOD GLUCOSE      POCT Blood Glucose.: 149 mg/dL (03 May 2019 11:41)  POCT Blood Glucose.: 154 mg/dL (03 May 2019 07:41)  POCT Blood Glucose.: 165 mg/dL (02 May 2019 21:16)  POCT Blood Glucose.: 212 mg/dL (02 May 2019 16:33)      RADIOLOGY & ADDITIONAL TESTS:

## 2019-05-03 NOTE — PROGRESS NOTE ADULT - PROBLEM SELECTOR PLAN 1
dx 3 years ago via EGD  worsening difficulty to swallowing solids  S/S recommended Dysphagia mechanical soft   esophagogram  showing Filling defects in the mid esophagus with irregular mucosal contour  EGD on Monday  NPO after MN on Sunday  Dr. Les VARGAS

## 2019-05-03 NOTE — PROGRESS NOTE ADULT - ASSESSMENT
74 year old male with history of Mcneill's esophagus presents with dysphagia and aspiration pneumonia. Doing well.     Plan:  EGD bumped till Monday   NPO post midnight Sunday night

## 2019-05-03 NOTE — PROGRESS NOTE ADULT - ASSESSMENT
75 y/o M with PMHx of brown's esophagus, HTN, DM, HLD, and hypothyroidism came in due to weakness presents with encephalopathy WBC 28K and sepsis of unknown etiology. Empirically started on abx and leukocytosis is better now . Pt also with behavioral problems of restlessness and confusion. Pt with urinary retention and was placed on Jiménez cath and no voiding well. Pt with dysphagia and hx of roula esophagus

## 2019-05-04 LAB
ANION GAP SERPL CALC-SCNC: 8 MMOL/L — SIGNIFICANT CHANGE UP (ref 5–17)
BUN SERPL-MCNC: 9 MG/DL — SIGNIFICANT CHANGE UP (ref 7–18)
CALCIUM SERPL-MCNC: 8.2 MG/DL — LOW (ref 8.4–10.5)
CHLORIDE SERPL-SCNC: 112 MMOL/L — HIGH (ref 96–108)
CO2 SERPL-SCNC: 21 MMOL/L — LOW (ref 22–31)
CREAT SERPL-MCNC: 1.36 MG/DL — HIGH (ref 0.5–1.3)
CULTURE RESULTS: SIGNIFICANT CHANGE UP
CULTURE RESULTS: SIGNIFICANT CHANGE UP
GLUCOSE BLDC GLUCOMTR-MCNC: 131 MG/DL — HIGH (ref 70–99)
GLUCOSE BLDC GLUCOMTR-MCNC: 174 MG/DL — HIGH (ref 70–99)
GLUCOSE BLDC GLUCOMTR-MCNC: 213 MG/DL — HIGH (ref 70–99)
GLUCOSE BLDC GLUCOMTR-MCNC: 234 MG/DL — HIGH (ref 70–99)
GLUCOSE SERPL-MCNC: 119 MG/DL — HIGH (ref 70–99)
PHOSPHATE SERPL-MCNC: 2.5 MG/DL — SIGNIFICANT CHANGE UP (ref 2.5–4.5)
POTASSIUM SERPL-MCNC: 3.2 MMOL/L — LOW (ref 3.5–5.3)
POTASSIUM SERPL-SCNC: 3.2 MMOL/L — LOW (ref 3.5–5.3)
SODIUM SERPL-SCNC: 141 MMOL/L — SIGNIFICANT CHANGE UP (ref 135–145)
SPECIMEN SOURCE: SIGNIFICANT CHANGE UP
SPECIMEN SOURCE: SIGNIFICANT CHANGE UP

## 2019-05-04 PROCEDURE — 99232 SBSQ HOSP IP/OBS MODERATE 35: CPT | Mod: GC

## 2019-05-04 RX ADMIN — SODIUM CHLORIDE 80 MILLILITER(S): 9 INJECTION INTRAMUSCULAR; INTRAVENOUS; SUBCUTANEOUS at 21:33

## 2019-05-04 RX ADMIN — Medication 30 MILLILITER(S): at 23:52

## 2019-05-04 RX ADMIN — Medication 25 MILLIGRAM(S): at 05:43

## 2019-05-04 RX ADMIN — ATORVASTATIN CALCIUM 40 MILLIGRAM(S): 80 TABLET, FILM COATED ORAL at 21:33

## 2019-05-04 RX ADMIN — Medication 3 MILLILITER(S): at 20:05

## 2019-05-04 RX ADMIN — HEPARIN SODIUM 5000 UNIT(S): 5000 INJECTION INTRAVENOUS; SUBCUTANEOUS at 17:00

## 2019-05-04 RX ADMIN — Medication 1 TABLET(S): at 16:58

## 2019-05-04 RX ADMIN — Medication 1 DROP(S): at 11:29

## 2019-05-04 RX ADMIN — OLANZAPINE 5 MILLIGRAM(S): 15 TABLET, FILM COATED ORAL at 11:29

## 2019-05-04 RX ADMIN — Medication 1 TABLET(S): at 11:29

## 2019-05-04 RX ADMIN — TAMSULOSIN HYDROCHLORIDE 0.8 MILLIGRAM(S): 0.4 CAPSULE ORAL at 21:33

## 2019-05-04 RX ADMIN — Medication 3 MILLILITER(S): at 14:33

## 2019-05-04 RX ADMIN — Medication 2: at 11:30

## 2019-05-04 RX ADMIN — Medication 1 TABLET(S): at 07:57

## 2019-05-04 RX ADMIN — AMLODIPINE BESYLATE 10 MILLIGRAM(S): 2.5 TABLET ORAL at 05:43

## 2019-05-04 RX ADMIN — Medication 75 MICROGRAM(S): at 05:43

## 2019-05-04 RX ADMIN — Medication 2: at 16:58

## 2019-05-04 RX ADMIN — HEPARIN SODIUM 5000 UNIT(S): 5000 INJECTION INTRAVENOUS; SUBCUTANEOUS at 05:43

## 2019-05-04 NOTE — PROGRESS NOTE ADULT - ASSESSMENT
75 y/o M with PMHx of brown's esophagus, HTN, DM, HLD, and hypothyroidism came in due to weakness presents with encephalopathy WBC 28K and sepsis of unknown etiology. Empirically started on abx and leukocytosis is better now . Pt also with behavioral problems of restlessness and confusion. Pt with urinary retention and was placed on Jiménez cath and no voiding well. Pt with dysphagia and hx of roula esophagus          A/P: 75 y/o M with   1.  Acute renal failure possibly ATN  2. esophageal thickening with abnormal esophagram- needs EGD  3. Lactic acidosis presumed due to dehydration  4. Hypokalemia  5. Essential HTN  6. Urinary retention- resolved  7. Metabolic encephalopathy- resolving  8. Brown esophagus  9. dysphagia  10 Leukocytosis- no source of infection identified. CT chest without evidence of PNA; discontinued antibiotics. Unlikely sepsis on admission.

## 2019-05-04 NOTE — PROGRESS NOTE ADULT - SUBJECTIVE AND OBJECTIVE BOX
MEDICAL ATTENDING NOTE    Patient is a 74y old  Male who presents with a chief complaint of Sepsis and dehydration (03 May 2019 14:13)      INTERVAL HPI/OVERNIGHT EVENTS: no new complaints    MEDICATIONS  (STANDING):  ALBUTerol/ipratropium for Nebulization 3 milliLiter(s) Nebulizer every 6 hours  amLODIPine   Tablet 10 milliGRAM(s) Oral daily  artificial  tears Solution 1 Drop(s) Both EYES daily  atorvastatin 40 milliGRAM(s) Oral at bedtime  dextrose 5%. 1000 milliLiter(s) (50 mL/Hr) IV Continuous <Continuous>  dextrose 50% Injectable 12.5 Gram(s) IV Push once  dextrose 50% Injectable 25 Gram(s) IV Push once  dextrose 50% Injectable 25 Gram(s) IV Push once  heparin  Injectable 5000 Unit(s) SubCutaneous every 12 hours  insulin lispro (HumaLOG) corrective regimen sliding scale   SubCutaneous three times a day before meals  levothyroxine 75 MICROGram(s) Oral daily  metoprolol tartrate 25 milliGRAM(s) Oral two times a day  OLANZapine 5 milliGRAM(s) Oral daily  potassium acid phosphate/sodium acid phosphate tablet (K-PHOS No. 2) 1 Tablet(s) Oral four times a day with meals  sodium chloride 0.9%. 1000 milliLiter(s) (80 mL/Hr) IV Continuous <Continuous>  tamsulosin 0.8 milliGRAM(s) Oral at bedtime  tiotropium 18 MICROgram(s) Capsule 1 Capsule(s) Inhalation daily    MEDICATIONS  (PRN):  dextrose 40% Gel 15 Gram(s) Oral once PRN Blood Glucose LESS THAN 70 milliGRAM(s)/deciliter  glucagon  Injectable 1 milliGRAM(s) IntraMuscular once PRN Glucose LESS THAN 70 milligrams/deciliter  guaiFENesin   Syrup  (Sugar-Free) 100 milliGRAM(s) Oral every 6 hours PRN Cough      __________________________________________________  ----------------------------------------------------------------------------------  REVIEW OF SYSTEMS: no fever, no SOB, No Chest pain; feels well      Vital Signs Last 24 Hrs  T(C): 36.6 (04 May 2019 14:05), Max: 37.5 (03 May 2019 20:08)  T(F): 97.8 (04 May 2019 14:05), Max: 99.5 (03 May 2019 20:08)  HR: 79 (04 May 2019 14:40) (71 - 94)  BP: 131/66 (04 May 2019 14:05) (128/53 - 134/63)  BP(mean): --  RR: 18 (04 May 2019 14:05) (18 - 18)  SpO2: 97% (04 May 2019 14:40) (97% - 99%)    _________________  PHYSICAL EXAM:  ---------------------------   NAD; Normocephalic;   LUNGS - no wheezing  HEART: S1 S2+   ABDOMEN: Soft, Nontender, non distended  EXTREMITIES: no cyanosis; no edema  NERVOUS SYSTEM:  Awake and alert; no new deficits    _________________________________________________  LABS:                        10.2   10.89 )-----------( 256      ( 03 May 2019 06:24 )             31.0     05-04    141  |  112<H>  |  9   ----------------------------<  119<H>  3.2<L>   |  21<L>  |  1.36<H>    Ca    8.2<L>      04 May 2019 07:19  Phos  2.5     05-04  Mg     1.6     05-03          CAPILLARY BLOOD GLUCOSE      POCT Blood Glucose.: 234 mg/dL (04 May 2019 11:17)  POCT Blood Glucose.: 131 mg/dL (04 May 2019 07:39)  POCT Blood Glucose.: 272 mg/dL (03 May 2019 21:07)  POCT Blood Glucose.: 190 mg/dL (03 May 2019 16:34)            Care Discussed with Consultants :     Plan of care was discussed with patient ; all questions and concerns were addressed and care was aligned with patient's wishes.  Patient has been advised to follow up with PMD upon discharge from the hospital.  Discharge plans discussed with nursing staff , case manger and .

## 2019-05-05 LAB
ANION GAP SERPL CALC-SCNC: 8 MMOL/L — SIGNIFICANT CHANGE UP (ref 5–17)
BUN SERPL-MCNC: 10 MG/DL — SIGNIFICANT CHANGE UP (ref 7–18)
CALCIUM SERPL-MCNC: 7.9 MG/DL — LOW (ref 8.4–10.5)
CHLORIDE SERPL-SCNC: 111 MMOL/L — HIGH (ref 96–108)
CO2 SERPL-SCNC: 22 MMOL/L — SIGNIFICANT CHANGE UP (ref 22–31)
CREAT SERPL-MCNC: 1.45 MG/DL — HIGH (ref 0.5–1.3)
GLUCOSE BLDC GLUCOMTR-MCNC: 141 MG/DL — HIGH (ref 70–99)
GLUCOSE BLDC GLUCOMTR-MCNC: 149 MG/DL — HIGH (ref 70–99)
GLUCOSE BLDC GLUCOMTR-MCNC: 198 MG/DL — HIGH (ref 70–99)
GLUCOSE BLDC GLUCOMTR-MCNC: 199 MG/DL — HIGH (ref 70–99)
GLUCOSE SERPL-MCNC: 167 MG/DL — HIGH (ref 70–99)
POTASSIUM SERPL-MCNC: 3.3 MMOL/L — LOW (ref 3.5–5.3)
POTASSIUM SERPL-SCNC: 3.3 MMOL/L — LOW (ref 3.5–5.3)
SODIUM SERPL-SCNC: 141 MMOL/L — SIGNIFICANT CHANGE UP (ref 135–145)

## 2019-05-05 PROCEDURE — 99233 SBSQ HOSP IP/OBS HIGH 50: CPT | Mod: GC

## 2019-05-05 RX ORDER — POTASSIUM CHLORIDE 20 MEQ
40 PACKET (EA) ORAL ONCE
Qty: 0 | Refills: 0 | Status: COMPLETED | OUTPATIENT
Start: 2019-05-05 | End: 2019-05-05

## 2019-05-05 RX ORDER — SODIUM CHLORIDE 9 MG/ML
1000 INJECTION, SOLUTION INTRAVENOUS
Qty: 0 | Refills: 0 | Status: DISCONTINUED | OUTPATIENT
Start: 2019-05-05 | End: 2019-05-06

## 2019-05-05 RX ORDER — MAGNESIUM SULFATE 500 MG/ML
1 VIAL (ML) INJECTION ONCE
Qty: 0 | Refills: 0 | Status: COMPLETED | OUTPATIENT
Start: 2019-05-05 | End: 2019-05-05

## 2019-05-05 RX ADMIN — TAMSULOSIN HYDROCHLORIDE 0.8 MILLIGRAM(S): 0.4 CAPSULE ORAL at 21:35

## 2019-05-05 RX ADMIN — Medication 40 MILLIEQUIVALENT(S): at 12:41

## 2019-05-05 RX ADMIN — Medication 3 MILLILITER(S): at 21:09

## 2019-05-05 RX ADMIN — OLANZAPINE 5 MILLIGRAM(S): 15 TABLET, FILM COATED ORAL at 11:21

## 2019-05-05 RX ADMIN — Medication 30 MILLILITER(S): at 21:34

## 2019-05-05 RX ADMIN — SODIUM CHLORIDE 50 MILLILITER(S): 9 INJECTION, SOLUTION INTRAVENOUS at 21:33

## 2019-05-05 RX ADMIN — Medication 1 DROP(S): at 11:21

## 2019-05-05 RX ADMIN — ATORVASTATIN CALCIUM 40 MILLIGRAM(S): 80 TABLET, FILM COATED ORAL at 21:35

## 2019-05-05 RX ADMIN — Medication 25 MILLIGRAM(S): at 05:36

## 2019-05-05 RX ADMIN — HEPARIN SODIUM 5000 UNIT(S): 5000 INJECTION INTRAVENOUS; SUBCUTANEOUS at 17:37

## 2019-05-05 RX ADMIN — Medication 75 MICROGRAM(S): at 05:36

## 2019-05-05 RX ADMIN — HEPARIN SODIUM 5000 UNIT(S): 5000 INJECTION INTRAVENOUS; SUBCUTANEOUS at 05:36

## 2019-05-05 RX ADMIN — Medication 1: at 11:39

## 2019-05-05 RX ADMIN — Medication 3 MILLILITER(S): at 14:32

## 2019-05-05 RX ADMIN — Medication 3 MILLILITER(S): at 09:36

## 2019-05-05 RX ADMIN — Medication 25 MILLIGRAM(S): at 17:37

## 2019-05-05 RX ADMIN — Medication 100 GRAM(S): at 12:40

## 2019-05-05 RX ADMIN — AMLODIPINE BESYLATE 10 MILLIGRAM(S): 2.5 TABLET ORAL at 05:36

## 2019-05-05 NOTE — PROGRESS NOTE ADULT - ASSESSMENT
75 y/o M with PMHx of brown's esophagus, HTN, DM, HLD, and hypothyroidism came in due to weakness presents with encephalopathy WBC 28K and sepsis of unknown etiology. Empirically started on abx and leukocytosis is better now . Pt also with behavioral problems of restlessness and confusion. Pt with urinary retention and was placed on Jiménez cath and no voiding well. Pt with dysphagia and hx of roula esophagus          A/P: 75 y/o M with   1.  Acute renal failure possibly ATN  2. esophageal thickening with abnormal esophagram- needs EGD  3. Lactic acidosis presumed due to dehydration  4. Hypokalemia  5. Essential HTN  6. Urinary retention- resolved  7. Metabolic encephalopathy- resolving  8. Brown esophagus  9. dysphagia  10 Leukocytosis- no source of infection identified. CT chest without evidence of PNA; discontinued antibiotics. Unlikely sepsis on admission. 73 y/o M with PMHx of brown's esophagus, HTN, DM, HLD, and hypothyroidism came in due to weakness presents with encephalopathy WBC 28K and sepsis of unknown etiology. Empirically started on abx and leukocytosis improved. Pt also with behavioral problems of restlessness and confusion. Pt with urinary retention and was placed on Jiménez cath. Pt with dysphagia and hx of roula esophagus    A/P: 73 y/o M with   1.  Acute renal failure possibly ATN  2. esophageal thickening with abnormal esophagram- needs EGD  3. Lactic acidosis presumed due to dehydration  4. Hypokalemia  5. Essential HTN  6. Urinary retention- resolved  7. Metabolic encephalopathy- resolving  8. Brown esophagus  9. dysphagia  10 Leukocytosis- no source of infection identified. CT chest without evidence of PNA; discontinued antibiotics. Unlikely sepsis on admission.

## 2019-05-05 NOTE — PROGRESS NOTE ADULT - SUBJECTIVE AND OBJECTIVE BOX
PGY1 Note discussed with Supervising Resident and Primary Attending.    Patient is a 74y old  Male who presents with a chief complaint of Sepsis and dehydration (04 May 2019 15:57)      INTERVAL HPI/OVERNIGHT EVENTS :    ***********************************************************************************************************    MEDICATIONS  (STANDING):  ALBUTerol/ipratropium for Nebulization 3 milliLiter(s) Nebulizer every 6 hours  amLODIPine   Tablet 10 milliGRAM(s) Oral daily  artificial  tears Solution 1 Drop(s) Both EYES daily  atorvastatin 40 milliGRAM(s) Oral at bedtime  dextrose 5%. 1000 milliLiter(s) (50 mL/Hr) IV Continuous <Continuous>  dextrose 50% Injectable 12.5 Gram(s) IV Push once  dextrose 50% Injectable 25 Gram(s) IV Push once  dextrose 50% Injectable 25 Gram(s) IV Push once  heparin  Injectable 5000 Unit(s) SubCutaneous every 12 hours  insulin lispro (HumaLOG) corrective regimen sliding scale   SubCutaneous three times a day before meals  levothyroxine 75 MICROGram(s) Oral daily  metoprolol tartrate 25 milliGRAM(s) Oral two times a day  OLANZapine 5 milliGRAM(s) Oral daily  tamsulosin 0.8 milliGRAM(s) Oral at bedtime  tiotropium 18 MICROgram(s) Capsule 1 Capsule(s) Inhalation daily    MEDICATIONS  (PRN):  dextrose 40% Gel 15 Gram(s) Oral once PRN Blood Glucose LESS THAN 70 milliGRAM(s)/deciliter  glucagon  Injectable 1 milliGRAM(s) IntraMuscular once PRN Glucose LESS THAN 70 milligrams/deciliter  guaiFENesin   Syrup  (Sugar-Free) 100 milliGRAM(s) Oral every 6 hours PRN Cough      ***********************************************************************************************************    Allergies    penicillin (Rash)    Intolerances        ***********************************************************************************************************    REVIEW OF SYSTEMS :  * CONSTITUTIONAL      : No Fever, Weight loss, or Fatigue  * EYES                             : No eye pain , Visual disturbances or Discharge  * RESPIRATORY             : No Cough, Wheezing, Chills or Hemoptysis; No shortness of breath  * CARDIOVASCULAR     : No Chest pain, Palpitations, Dizziness, or Leg swelling  * GASTROINTESTINAL  : No Abdominal or Epigastric pain. No Nausea, Vomiting or Hematemesis; No Diarrhea or Constipation. No Melena or Hematochezia.  * GENITOURINARY        : No Dysuria , Frequency , Haematuria   * NEUROLOGICAL          : No Headaches, Memory loss, Loss of trength, Numbness, or Tremors  * MUSCULOSKELETAL   : No Joint pain  * PSYCHIATRY                 : No Depression or Anxiety   * HEME/LYMPH              : No Easy Bruising or Bleeding gums  * SKIN                               : No Itching, Burning, Rashes, or Lesions     ***********************************************************************************************************    Vital Signs Last 24 Hrs  T(C): 36.7 (05 May 2019 05:02), Max: 36.9 (04 May 2019 20:15)  T(F): 98 (05 May 2019 05:02), Max: 98.5 (04 May 2019 20:15)  HR: 77 (05 May 2019 05:02) (71 - 85)  BP: 153/67 (05 May 2019 05:02) (121/61 - 153/67)  BP(mean): --  RR: 16 (05 May 2019 05:02) (16 - 18)  SpO2: 93% (05 May 2019 05:02) (93% - 98%)    ***********************************************************************************************************    PHYSICAL EXAM :  * GENERAL                 : NAD, Well-groomed, Well-developed  * HEAD                       :  Atraumatic, Normocephalic  * EYES                         : EOMI, PERRLA, Conjunctiva and Sclera clear  * ENT                           : Moist Mucous Membranes  * NECK                         : Supple, No JVD, Normal Thyroid  * CHEST/LUNG           : Clear to Auscultation bilaterally; No Rales, Rhonchi, Wheezing or Rubs  * HEART                       : Regular Rate and Rhythm; No murmurs, Rubs or gallops  * ABDOMEN                : Soft, Non-tender, Non-distended; Bowel Sounds present  * NERVOUS SYSTEM  :  Alert & Oriented X3, Good Concentration; Motor Strength 5/5 B/L UL LL ; DTRs 2+ Intact and Symmetric  * EXTREMITIES            :  2+ Peripheral Pulses, No clubbing, cyanosis, or edema  * SKIN                           : No Rashes or Lesions    **********************************************************************************************************  LABS:      05-04    141  |  112<H>  |  9   ----------------------------<  119<H>  3.2<L>   |  21<L>  |  1.36<H>    Ca    8.2<L>      04 May 2019 07:19  Phos  2.5     05-04          CAPILLARY BLOOD GLUCOSE      POCT Blood Glucose.: 141 mg/dL (05 May 2019 08:04)  POCT Blood Glucose.: 174 mg/dL (04 May 2019 21:09)  POCT Blood Glucose.: 213 mg/dL (04 May 2019 16:12)      **********************************************************************************************************    RADIOLOGY & ADDITIONAL TESTS:   No radiological imaging was required    Imaging Personally Reviewed   :  [ ] YES  [ ] NO    Consultant(s) Notes Reviewed :  [ ] YES  [ ] NO PGY1 Note discussed with Supervising Resident and Primary Attending.    Patient is a 74y old  Male who presents with a chief complaint of Sepsis and dehydration (04 May 2019 15:57)      INTERVAL HPI/OVERNIGHT EVENTS :  Notes occasional difficulty swallowing certain foods.   ***********************************************************************************************************    MEDICATIONS  (STANDING):  ALBUTerol/ipratropium for Nebulization 3 milliLiter(s) Nebulizer every 6 hours  amLODIPine   Tablet 10 milliGRAM(s) Oral daily  artificial  tears Solution 1 Drop(s) Both EYES daily  atorvastatin 40 milliGRAM(s) Oral at bedtime  dextrose 5%. 1000 milliLiter(s) (50 mL/Hr) IV Continuous <Continuous>  dextrose 50% Injectable 12.5 Gram(s) IV Push once  dextrose 50% Injectable 25 Gram(s) IV Push once  dextrose 50% Injectable 25 Gram(s) IV Push once  heparin  Injectable 5000 Unit(s) SubCutaneous every 12 hours  insulin lispro (HumaLOG) corrective regimen sliding scale   SubCutaneous three times a day before meals  levothyroxine 75 MICROGram(s) Oral daily  metoprolol tartrate 25 milliGRAM(s) Oral two times a day  OLANZapine 5 milliGRAM(s) Oral daily  tamsulosin 0.8 milliGRAM(s) Oral at bedtime  tiotropium 18 MICROgram(s) Capsule 1 Capsule(s) Inhalation daily    MEDICATIONS  (PRN):  dextrose 40% Gel 15 Gram(s) Oral once PRN Blood Glucose LESS THAN 70 milliGRAM(s)/deciliter  glucagon  Injectable 1 milliGRAM(s) IntraMuscular once PRN Glucose LESS THAN 70 milligrams/deciliter  guaiFENesin   Syrup  (Sugar-Free) 100 milliGRAM(s) Oral every 6 hours PRN Cough      ***********************************************************************************************************    Allergies    penicillin (Rash)    Intolerances        ***********************************************************************************************************    REVIEW OF SYSTEMS :  * CONSTITUTIONAL      : No Fever, Weight loss, or Fatigue  * EYES                             : No eye pain , Visual disturbances or Discharge  * RESPIRATORY             : No Cough, Wheezing, Chills or Hemoptysis; No shortness of breath  * CARDIOVASCULAR     : No Chest pain, Palpitations, Dizziness, or Leg swelling  * GASTROINTESTINAL  : No Abdominal or Epigastric pain. No Nausea, Vomiting or Hematemesis; No Diarrhea or Constipation. No Melena or Hematochezia.  * GENITOURINARY        : No Dysuria , Frequency , Haematuria   * NEUROLOGICAL          : No Headaches, Memory loss, Loss of trength, Numbness, or Tremors  * MUSCULOSKELETAL   : No Joint pain  * PSYCHIATRY                 : No Depression or Anxiety   * HEME/LYMPH              : No Easy Bruising or Bleeding gums  * SKIN                               : No Itching, Burning, Rashes, or Lesions     ***********************************************************************************************************    Vital Signs Last 24 Hrs  T(C): 36.7 (05 May 2019 05:02), Max: 36.9 (04 May 2019 20:15)  T(F): 98 (05 May 2019 05:02), Max: 98.5 (04 May 2019 20:15)  HR: 77 (05 May 2019 05:02) (71 - 85)  BP: 153/67 (05 May 2019 05:02) (121/61 - 153/67)  BP(mean): --  RR: 16 (05 May 2019 05:02) (16 - 18)  SpO2: 93% (05 May 2019 05:02) (93% - 98%)    ***********************************************************************************************************    PHYSICAL EXAM :  * GENERAL                 : NAD, Well-groomed, Well-developed  * HEAD                       :  Atraumatic, Normocephalic  * EYES                         : EOMI, PERRLA, Conjunctiva and Sclera clear  * ENT                           : Moist Mucous Membranes  * NECK                         : Supple, No JVD, Normal Thyroid  * CHEST/LUNG           : Clear to Auscultation bilaterally; No Rales, Rhonchi, Wheezing or Rubs  * HEART                       : Regular Rate and Rhythm; No murmurs, Rubs or gallops  * ABDOMEN                : Soft, Non-tender, Non-distended; Bowel Sounds present  * NERVOUS SYSTEM  :  Alert & Oriented X3, Good Concentration; Motor Strength 5/5 B/L UL LL ; DTRs 2+ Intact and Symmetric  * EXTREMITIES            :  2+ Peripheral Pulses, No clubbing, cyanosis, or edema  * SKIN                           : No Rashes or Lesions    **********************************************************************************************************  LABS:      05-04    141  |  112<H>  |  9   ----------------------------<  119<H>  3.2<L>   |  21<L>  |  1.36<H>    Ca    8.2<L>      04 May 2019 07:19  Phos  2.5     05-04          CAPILLARY BLOOD GLUCOSE      POCT Blood Glucose.: 141 mg/dL (05 May 2019 08:04)  POCT Blood Glucose.: 174 mg/dL (04 May 2019 21:09)  POCT Blood Glucose.: 213 mg/dL (04 May 2019 16:12)      **********************************************************************************************************    RADIOLOGY & ADDITIONAL TESTS:   No radiological imaging was required    Imaging Personally Reviewed   :  [ ] YES  [ ] NO    Consultant(s) Notes Reviewed :  [ ] YES  [ ] NO

## 2019-05-05 NOTE — DIETITIAN INITIAL EVALUATION ADULT. - PERTINENT LABORATORY DATA
05-04 Na141 mmol/L Glu 119 mg/dL<H> K+ 3.2 mmol/L<L> Cr  1.36 mg/dL<H> BUN 9 mg/dL 05-04 Phos 2.5 mg/dL 04-29 Alb 2.5 g/dL<L> 04-29 [id number] 7.2 %<H> 04-29 Chol 94 mg/dL LDL 25 mg/dL HDL 35 mg/dL<L> Trig 171 mg/dL<H>

## 2019-05-05 NOTE — DIETITIAN INITIAL EVALUATION ADULT. - NUTRITION INTERVENTION
Medical Food Supplements/Feeding Assistance/Meals and Snack/Vitamin/Collaboration and Referral of Nutrition Care

## 2019-05-05 NOTE — DIETITIAN INITIAL EVALUATION ADULT. - OTHER INFO
Patient seen for LOS. Patient from home lives with wife. Visited pt. alert but weak, reports fair po intake, noted difficulty to swallowing "food" during some mealtime, denies nausea/vomiting or diarrhea PTA, stated not sure about his weight?? current wt. 171 Lbs noted. Pt. seen by [facility] on 4/30/19 & recommendation noted, consuming 40-50% of meals & tolerating, d/w PCA, pending EGD procedure on Mon. (5/6) & followed by GI/team, Psych consult noted, awaiting placement, followed by , skin intact. d/w RN.

## 2019-05-05 NOTE — PROGRESS NOTE ADULT - ATTENDING COMMENTS
81 yo male with pmh of Esteban's esophagus, htn, dm, hld presented with weakness and encephalopathy with 28k wbc. Empirically treated for suspected pneumonia with Vanco, Rocephin, Azithromycin and Azactam. Found to have urinary retention sp negron and passed TOV. Esophagram done for worsening dysphagia, results showed filling defect.     -Pneumonia: CXR with perihilar infiltrates. Afebrile, last wbc 10.89. Monitor off antibiotics.   -Barrets esophagus: EGD in am for filling defect on esophagram. Cw dysphagia diet.  -Dispo: NATHAN.

## 2019-05-05 NOTE — DIETITIAN INITIAL EVALUATION ADULT. - FACTORS AFF FOOD INTAKE
other (specify)/difficulty chewing/weakness, sepsis, brown's esophagus, HTN, diabetes, HLD, BLANCHE/difficulty swallowing
N/A

## 2019-05-05 NOTE — DIETITIAN INITIAL EVALUATION ADULT. - DIET TYPE
consistent carbohydrate (evening snack)/DASH/TLC (sodium and cholesterol restricted diet)/dysphagia 2, mechanical soft, thin liquids

## 2019-05-06 LAB
ANION GAP SERPL CALC-SCNC: 7 MMOL/L — SIGNIFICANT CHANGE UP (ref 5–17)
BUN SERPL-MCNC: 11 MG/DL — SIGNIFICANT CHANGE UP (ref 7–18)
CALCIUM SERPL-MCNC: 8.5 MG/DL — SIGNIFICANT CHANGE UP (ref 8.4–10.5)
CHLORIDE SERPL-SCNC: 110 MMOL/L — HIGH (ref 96–108)
CO2 SERPL-SCNC: 22 MMOL/L — SIGNIFICANT CHANGE UP (ref 22–31)
CREAT SERPL-MCNC: 1.45 MG/DL — HIGH (ref 0.5–1.3)
GLUCOSE BLDC GLUCOMTR-MCNC: 134 MG/DL — HIGH (ref 70–99)
GLUCOSE BLDC GLUCOMTR-MCNC: 139 MG/DL — HIGH (ref 70–99)
GLUCOSE BLDC GLUCOMTR-MCNC: 144 MG/DL — HIGH (ref 70–99)
GLUCOSE BLDC GLUCOMTR-MCNC: 158 MG/DL — HIGH (ref 70–99)
GLUCOSE SERPL-MCNC: 156 MG/DL — HIGH (ref 70–99)
HCT VFR BLD CALC: 31.3 % — LOW (ref 39–50)
HGB BLD-MCNC: 10.1 G/DL — LOW (ref 13–17)
MAGNESIUM SERPL-MCNC: 2.2 MG/DL — SIGNIFICANT CHANGE UP (ref 1.6–2.6)
MCHC RBC-ENTMCNC: 29.6 PG — SIGNIFICANT CHANGE UP (ref 27–34)
MCHC RBC-ENTMCNC: 32.3 GM/DL — SIGNIFICANT CHANGE UP (ref 32–36)
MCV RBC AUTO: 91.8 FL — SIGNIFICANT CHANGE UP (ref 80–100)
NRBC # BLD: 0 /100 WBCS — SIGNIFICANT CHANGE UP (ref 0–0)
PHOSPHATE SERPL-MCNC: 2.2 MG/DL — LOW (ref 2.5–4.5)
PLATELET # BLD AUTO: 276 K/UL — SIGNIFICANT CHANGE UP (ref 150–400)
POTASSIUM SERPL-MCNC: 3.6 MMOL/L — SIGNIFICANT CHANGE UP (ref 3.5–5.3)
POTASSIUM SERPL-SCNC: 3.6 MMOL/L — SIGNIFICANT CHANGE UP (ref 3.5–5.3)
RBC # BLD: 3.41 M/UL — LOW (ref 4.2–5.8)
RBC # FLD: 13.9 % — SIGNIFICANT CHANGE UP (ref 10.3–14.5)
SODIUM SERPL-SCNC: 139 MMOL/L — SIGNIFICANT CHANGE UP (ref 135–145)
WBC # BLD: 9.67 K/UL — SIGNIFICANT CHANGE UP (ref 3.8–10.5)
WBC # FLD AUTO: 9.67 K/UL — SIGNIFICANT CHANGE UP (ref 3.8–10.5)

## 2019-05-06 PROCEDURE — 43239 EGD BIOPSY SINGLE/MULTIPLE: CPT

## 2019-05-06 PROCEDURE — 88312 SPECIAL STAINS GROUP 1: CPT | Mod: 26

## 2019-05-06 PROCEDURE — 88305 TISSUE EXAM BY PATHOLOGIST: CPT | Mod: 26

## 2019-05-06 PROCEDURE — 74177 CT ABD & PELVIS W/CONTRAST: CPT | Mod: 26

## 2019-05-06 RX ORDER — IOHEXOL 300 MG/ML
30 INJECTION, SOLUTION INTRAVENOUS ONCE
Qty: 0 | Refills: 0 | Status: COMPLETED | OUTPATIENT
Start: 2019-05-06 | End: 2019-05-06

## 2019-05-06 RX ORDER — SODIUM,POTASSIUM PHOSPHATES 278-250MG
1 POWDER IN PACKET (EA) ORAL
Qty: 0 | Refills: 0 | Status: COMPLETED | OUTPATIENT
Start: 2019-05-06 | End: 2019-05-07

## 2019-05-06 RX ORDER — PANTOPRAZOLE SODIUM 20 MG/1
1 TABLET, DELAYED RELEASE ORAL
Qty: 30 | Refills: 0 | OUTPATIENT
Start: 2019-05-06 | End: 2019-06-04

## 2019-05-06 RX ORDER — METOPROLOL TARTRATE 50 MG
1 TABLET ORAL
Qty: 60 | Refills: 0 | OUTPATIENT
Start: 2019-05-06 | End: 2019-06-04

## 2019-05-06 RX ORDER — LOSARTAN POTASSIUM 100 MG/1
1 TABLET, FILM COATED ORAL
Qty: 0 | Refills: 0 | COMMUNITY

## 2019-05-06 RX ORDER — PANTOPRAZOLE SODIUM 20 MG/1
40 TABLET, DELAYED RELEASE ORAL
Qty: 0 | Refills: 0 | Status: DISCONTINUED | OUTPATIENT
Start: 2019-05-06 | End: 2019-05-09

## 2019-05-06 RX ORDER — TAMSULOSIN HYDROCHLORIDE 0.4 MG/1
2 CAPSULE ORAL
Qty: 60 | Refills: 0 | OUTPATIENT
Start: 2019-05-06 | End: 2019-06-04

## 2019-05-06 RX ADMIN — Medication 1 TABLET(S): at 21:38

## 2019-05-06 RX ADMIN — ATORVASTATIN CALCIUM 40 MILLIGRAM(S): 80 TABLET, FILM COATED ORAL at 21:38

## 2019-05-06 RX ADMIN — Medication 1: at 07:53

## 2019-05-06 RX ADMIN — OLANZAPINE 5 MILLIGRAM(S): 15 TABLET, FILM COATED ORAL at 12:38

## 2019-05-06 RX ADMIN — IOHEXOL 30 MILLILITER(S): 300 INJECTION, SOLUTION INTRAVENOUS at 17:07

## 2019-05-06 RX ADMIN — Medication 3 MILLILITER(S): at 08:55

## 2019-05-06 RX ADMIN — TAMSULOSIN HYDROCHLORIDE 0.8 MILLIGRAM(S): 0.4 CAPSULE ORAL at 21:38

## 2019-05-06 RX ADMIN — HEPARIN SODIUM 5000 UNIT(S): 5000 INJECTION INTRAVENOUS; SUBCUTANEOUS at 17:08

## 2019-05-06 NOTE — PROGRESS NOTE ADULT - ASSESSMENT
75 y/o M with PMHx of brown's esophagus, HTN, DM, HLD, and hypothyroidism came in due to weakness presents with encephalopathy WBC 28K and sepsis of unknown etiology. Empirically started on abx and leukocytosis improved. Pt also with behavioral problems of restlessness and confusion. Pt with urinary retention and was placed on Jiménez cath. Pt with dysphagia and hx of roula esophagus

## 2019-05-06 NOTE — CONSULT NOTE ADULT - ASSESSMENT
# Esophageal Mass:  # Liver lesions  # Extensive Retro-peritoneal LNs and mesenteric masses    Likely Metastatic Esophageal Ca in setting of brown's  Low Albumin    Recs:  -Await pathology report  -If AdenoCa,on path confirmed, please send for Her-2, MSI, PD-L1 testing.     Thank you for the consult  Case d/w Dr. Garcia  Will continue to follow. # Esophageal Mass:  # Liver lesions  # Extensive Retro-peritoneal LNs and mesenteric masses  Likely Metastatic Esophageal Ca in setting of brown's  Low Albumin    Recs:  -Await pathology report  -If AdenoCa confirmed on path, please send for Her-2, MSI, PD-L1 testing.   -Check CEA  -Bone scan  -May need biopsy of Liver lesion  -Nutrition consult    # Anemia:  NCNC  -Check iron panel, b12, folate, TSH    Thank you for the consult  Case d/w Dr. Garcia  Will continue to follow.

## 2019-05-06 NOTE — DISCHARGE NOTE PROVIDER - NSDCCPCAREPLAN_GEN_ALL_CORE_FT
PRINCIPAL DISCHARGE DIAGNOSIS  Diagnosis: Mcneill esophagus  Assessment and Plan of Treatment: You have difficulty swalloing due to barretts, had oesophagogram that shows some filling defect, EGD shows oesophageal mass, gastritis, duodenal ulcer, f/up with pathology. Outpatient f/up recommended   C/w protonix   C/w soft mechanical diet      SECONDARY DISCHARGE DIAGNOSES  Diagnosis: Hypothyroidism  Assessment and Plan of Treatment: C/w synthyroid    Diagnosis: BLANCHE (acute kidney injury)  Assessment and Plan of Treatment: Your kidney function was worsneing initially, likely due to dehydration and due to meds, later improved with IVF.       Diagnosis: Mood disorder  Assessment and Plan of Treatment: You found to have generalized anxiety disorder, c.w zyprexa    Diagnosis: Sepsis  Assessment and Plan of Treatment: You had fever initially, seems to likely have pneumonia, but later no pneumonia seen on CT chest so antibiotics discontinued. PRINCIPAL DISCHARGE DIAGNOSIS  Diagnosis: Esophageal mass  Assessment and Plan of Treatment: EGD shows oesophageal mass, gastritis, duodenal ulcer, f/up with pathology report, Ct abdomen with esophageal mass, lymphadenopathy, liver lesion,.  outpatient  f/up for pathology report, and further w/up  CEA f/up      SECONDARY DISCHARGE DIAGNOSES  Diagnosis: Mcneill esophagus  Assessment and Plan of Treatment: You have difficulty swalloing due to barretts,  C/w protonix   C/w soft mechanical diet    Diagnosis: Hypothyroidism  Assessment and Plan of Treatment: C/w synthyroid    Diagnosis: BLANCHE (acute kidney injury)  Assessment and Plan of Treatment: Your kidney function was worsneing initially, likely due to dehydration and due to meds, later improved with IVF.   Hold losartan and diuretics  for now       Diagnosis: Mood disorder  Assessment and Plan of Treatment: You found to have generalized anxiety disorder, c.w zyprexa    Diagnosis: History of hypertension  Assessment and Plan of Treatment: C/w lopressor and amlodipine , hold lasix and HCtz, repeat bmp in 1 week and restart as per pcp recs    Diagnosis: Sepsis  Assessment and Plan of Treatment: You had fever initially, seems to likely have pneumonia, but later no pneumonia seen on CT chest so antibiotics discontinued. PRINCIPAL DISCHARGE DIAGNOSIS  Diagnosis: Esophageal mass  Assessment and Plan of Treatment: EGD shows oesophageal mass, gastritis, duodenal ulcer, Ct abdomen with esophageal mass, lymphadenopathy, liver lesion,. seen by oncologist, path report shows -----------------   outpatient  f/up for pathology report, and further w/up        SECONDARY DISCHARGE DIAGNOSES  Diagnosis: Mcneill esophagus  Assessment and Plan of Treatment: You have difficulty swalloing due to barretts  C/w protonix   C/w soft mechanical diet    Diagnosis: Hypothyroidism  Assessment and Plan of Treatment: C/w synthyroid    Diagnosis: BLANCHE (acute kidney injury)  Assessment and Plan of Treatment: Your kidney function was worsneing initially, due to dehydration, meds and also for urinary retention   C/w flomax  Hold losartan and diuretics  for now , check BMP in 1 week and restart as per PCP recs      Diagnosis: Mood disorder  Assessment and Plan of Treatment: You found to have generalized anxiety disorder, c.w zyprexa    Diagnosis: History of hypertension  Assessment and Plan of Treatment: C/w lopressor and amlodipine , hold lasix and HCtz, repeat bmp in 1 week and restart as per pcp recs    Diagnosis: Diabetes  Assessment and Plan of Treatment: You were given insulin inpatient, HBA1c is 7.2, you can resume your januvia upon discharge , since your kidney function is slightly on lower side will reduce the metformin 750 ER to once daily (instead of BID)  Monitor the sugar and f/up with your PCP regarding use of metformin    Diagnosis: Sepsis  Assessment and Plan of Treatment: You had fever initially, seems to likely have pneumonia, but later no pneumonia seen on CT chest so antibiotics discontinued. PRINCIPAL DISCHARGE DIAGNOSIS  Diagnosis: Esophageal mass  Assessment and Plan of Treatment: EGD shows oesophageal mass, gastritis, duodenal ulcer, Ct abdomen with esophageal mass, lymphadenopathy, liver lesion,. seen by oncologist, path report  with gastric biopsy shows chronic mild gastritis, proximal and distal oesophageal biopsy with glandular metaplasia .   outpatient  f/up recommended with oncologist and gastroenterologist   Also found to have liver lesions and lymphadenopathy, recommend f/up outpatient        SECONDARY DISCHARGE DIAGNOSES  Diagnosis: Mcneill esophagus  Assessment and Plan of Treatment: You have difficulty swalloing due to barretts  C/w protonix   C/w mechanical soft diet    Diagnosis: Hypothyroidism  Assessment and Plan of Treatment: C/w synthyroid    Diagnosis: BLANCHE (acute kidney injury)  Assessment and Plan of Treatment: Your kidney function was worsneing initially, due to dehydration, meds and also for urinary retention   C/w flomax  Hold losartan and diuretics  for now , check BMP in 1 week and restart as per PCP recs      Diagnosis: Mood disorder  Assessment and Plan of Treatment: You found to have generalized anxiety disorder, c.w zyprexa    Diagnosis: History of hypertension  Assessment and Plan of Treatment: C/w lopressor and amlodipine , hold lasix and HCtz, repeat bmp in 1 week and restart as per pcp recs    Diagnosis: Diabetes  Assessment and Plan of Treatment: You were given insulin inpatient, HBA1c is 7.2, you can resume your januvia upon discharge , since your kidney function is slightly on lower side will reduce the metformin 750 ER to once daily (instead of BID)  Monitor the sugar and f/up with your PCP regarding use of metformin    Diagnosis: Sepsis  Assessment and Plan of Treatment: You had fever initially, seems to likely have pneumonia, but later no pneumonia seen on CT chest so antibiotics discontinued. PRINCIPAL DISCHARGE DIAGNOSIS  Diagnosis: Esophageal mass  Assessment and Plan of Treatment: EGD shows oesophageal mass, gastritis, duodenal ulcer, Ct abdomen with esophageal mass, lymphadenopathy, liver lesion,. seen by oncologist, path report  with gastric biopsy shows chronic mild gastritis, proximal and distal oesophageal biopsy with glandular metaplasia . Due to inconclusive results, repeat EGD done and biopsies sent again.   outpatient  f/up recommended with oncologist and gastroenterologist   Also found to have liver lesions and lymphadenopathy, recommend f/up outpatient  Need to f/up biopsy results        SECONDARY DISCHARGE DIAGNOSES  Diagnosis: Mcneill esophagus  Assessment and Plan of Treatment: You have difficulty swalloing due to barretts  C/w protonix   C/w mechanical soft diet    Diagnosis: Hypothyroidism  Assessment and Plan of Treatment: C/w synthyroid    Diagnosis: BLANCHE (acute kidney injury)  Assessment and Plan of Treatment: Your kidney function was worsneing initially, due to dehydration, meds and also for urinary retention   C/w flomax  Hold losartan and diuretics  for now , check BMP in 1 week and restart as per PCP recs      Diagnosis: Mood disorder  Assessment and Plan of Treatment: You found to have generalized anxiety disorder, c.w zyprexa    Diagnosis: History of hypertension  Assessment and Plan of Treatment: C/w lopressor and amlodipine , hold lasix and HCtz, repeat bmp in 1 week and restart as per pcp recs    Diagnosis: Diabetes  Assessment and Plan of Treatment: You were given insulin inpatient, HBA1c is 7.2, you can resume your januvia upon discharge , since your kidney function is slightly on lower side will reduce the metformin 750 ER to once daily (instead of BID)  Monitor the sugar and f/up with your PCP regarding use of metformin    Diagnosis: Sepsis  Assessment and Plan of Treatment: You had fever initially, seems to likely have pneumonia, but later no pneumonia seen on CT chest so antibiotics discontinued.

## 2019-05-06 NOTE — CHART NOTE - NSCHARTNOTEFT_GEN_A_CORE
Esophagogastroduodenoscopy Report  Indication:  Dysphagia   Referring MD: Dr. Garcia   Instrument:  #[id number]  Anesthesia: MAC  Consent:  Informed consent was obtained from the patient after providing any opportunity for questions  Procedure: The gastroscope was gently passed through the incisoral orifice into the oral cavity and under direct visualization the esophagus was intubated. The endoscope was passed down the esophagus, through the stomach and into the 3rd portion. Color, texture, mucosa and anatomy of the esophagus, stomach, and duodenum were carefully examined with the scope. The patient tolerated the procedure well. After completion of the examination, the patient was transferred to the recovery room.   Preparation: NPO   Findings:   Oropharynx	Normal  Esophagus	Extending from 21 cm (from the incisors) to 32 cm (from the incisors) a friable, ulcerated, mass. Biopsies at the proximal edge of the ulcer [3] and distal edge of the ulcer taken [2]  EG-junction	As above   Cardia	Normal  Body	Normal   Antrum	-Mild erythema and edema. Random biopsy taken. [1]  Pylorus	Normal  Duodenal Bulb	Along the anterior wall a 1.5 clean based ulcer.   2nd portion	Normal  3rd portion	Normal  Date and time:5/6/2019 10:26:48 AM  EBL:0  Impression: 1- Esophageal mass 2- Duodenal ulcer 3- Gastritis       Plan: 1- Resume diet and medications 2- Aspiration precautions 3- CT of the chest 3- Oncology consult. 4- Follow up pathology 5- PPI daily  6- Avoid NSAIDs     Procedure Start Time:  10:14 am   Procedure End Time:    10:19 am            Attending:       Stiven Ceron M.D.   Date and Time: 5/6/2019 10:26:48 AM

## 2019-05-06 NOTE — PROGRESS NOTE ADULT - ATTENDING COMMENTS
Patient was seen and examined by myself. Case was discussed with house staff in details. I have reviewed and agree with the plan as outlined above with edits where appropriate.    Vital Signs Last 24 Hrs  T(C): 37 (06 May 2019 20:24), Max: 37.1 (06 May 2019 14:20)  T(F): 98.6 (06 May 2019 20:24), Max: 98.8 (06 May 2019 14:20)  HR: 81 (06 May 2019 20:24) (77 - 94)  BP: 152/78 (06 May 2019 20:24) (122/64 - 152/78)  BP(mean): --  RR: 17 (06 May 2019 20:24) (17 - 18)  SpO2: 99% (06 May 2019 20:24) (92% - 99%)                            10.1   9.67  )-----------( 276      ( 06 May 2019 07:46 )             31.3       05-06    139  |  110<H>  |  11  ----------------------------<  156<H>  3.6   |  22  |  1.45<H>    Ca    8.5      06 May 2019 07:46  Phos  2.2     05-06  Mg     2.2     05-06    RADIOLOGY review:     A/P: 75 y/o M with   1. esophageal  mass noted on  EGD with h/o Mcneill's sophagus likely malignancy  2. Acute renal failure possibly ATN  3. Disseminated disease with lymphadenopathy likely disseminated cancer  4. Hypokalemia  5. Essential HTN  6. Urinary retention- resolved  7. Metabolic encephalopathy- resolving  8. Lactic acidosis presumed due to dehydration  9. dysphagia likely due to esophageal abnormality   10 Leukocytosis    Plan: CT abd. and pelvis obtained this afternoon- revealed multiple lesions in the liver with lymphadenopathy   oncology consulted- Dr Littlejohn  follow up pathology report  Ongoing malignancy work up  Overall prognosis is poor.  Additional plan as outlined above.

## 2019-05-06 NOTE — PROGRESS NOTE ADULT - SUBJECTIVE AND OBJECTIVE BOX
Patient is a 74y old  Male who presents with a chief complaint of Sepsis and dehydration (05 May 2019 11:18)    Interval Hx: Patient is NPO for EGD    MEDICATIONS  (STANDING):  ALBUTerol/ipratropium for Nebulization 3 milliLiter(s) Nebulizer every 6 hours  amLODIPine   Tablet 10 milliGRAM(s) Oral daily  artificial  tears Solution 1 Drop(s) Both EYES daily  atorvastatin 40 milliGRAM(s) Oral at bedtime  dextrose 5%. 1000 milliLiter(s) (50 mL/Hr) IV Continuous <Continuous>  heparin  Injectable 5000 Unit(s) SubCutaneous every 12 hours  insulin lispro (HumaLOG) corrective regimen sliding scale   SubCutaneous three times a day before meals  levothyroxine 75 MICROGram(s) Oral daily  metoprolol tartrate 25 milliGRAM(s) Oral two times a day  OLANZapine 5 milliGRAM(s) Oral daily  tamsulosin 0.8 milliGRAM(s) Oral at bedtime  tiotropium 18 MICROgram(s) Capsule 1 Capsule(s) Inhalation daily    MEDICATIONS  (PRN):  aluminum hydroxide/magnesium hydroxide/simethicone Suspension 30 milliLiter(s) Oral every 6 hours PRN Dyspepsia  glucagon  Injectable 1 milliGRAM(s) IntraMuscular once PRN Glucose LESS THAN 70 milligrams/deciliter  guaiFENesin   Syrup  (Sugar-Free) 100 milliGRAM(s) Oral every 6 hours PRN Cough      Allergies    penicillin (Rash)    Intolerances      __________________________________________________  REVIEW OF SYSTEMS:    CONSTITUTIONAL: No fever,   EYES: no acute visual disturbances  NECK: No pain or stiffness  RESPIRATORY: No cough; No shortness of breath  CARDIOVASCULAR: No chest pain, no palpitations  GASTROINTESTINAL: No pain. No nausea or vomiting; No diarrhea       Vital Signs Last 24 Hrs  T(C): 36.2 (06 May 2019 05:05), Max: 37.2 (05 May 2019 20:12)  T(F): 97.2 (06 May 2019 05:05), Max: 98.9 (05 May 2019 20:12)  HR: 91 (06 May 2019 09:07) (77 - 94)  BP: 148/80 (06 May 2019 05:05) (128/63 - 148/80)  BP(mean): --  RR: 18 (06 May 2019 05:05) (16 - 18)  SpO2: 98% (06 May 2019 09:07) (92% - 98%)    ________________________________________________  PHYSICAL EXAM:  GENERAL: NAD  HEAD:  Normocephalic  EYES:  conjunctiva and sclera clear  NECK: Supple, No JVD    NERVOUS SYSTEM:  Alert & Oriented X3,   CHEST/LUNG: Clear to auscultation bilaterally;   HEART: S1 S2+;   ABDOMEN: Soft, Nontender, Nondistended; Bowel sounds present  EXTREMITIES: no cyanosis; no edema; no calf tenderness    _________________________________________________  LABS:                        10.1   9.67  )-----------( 276      ( 06 May 2019 07:46 )             31.3     05-06    139  |  110<H>  |  11  ----------------------------<  156<H>  3.6   |  22  |  1.45<H>    Ca    8.5      06 May 2019 07:46  Phos  2.2     05-06  Mg     2.2     05-06          CAPILLARY BLOOD GLUCOSE      POCT Blood Glucose.: 158 mg/dL (06 May 2019 07:33)  POCT Blood Glucose.: 199 mg/dL (05 May 2019 21:16)  POCT Blood Glucose.: 149 mg/dL (05 May 2019 16:19)  POCT Blood Glucose.: 198 mg/dL (05 May 2019 11:26)

## 2019-05-06 NOTE — PROGRESS NOTE ADULT - PROBLEM SELECTOR PLAN 1
dx 3 years ago via EGD  worsening difficulty to swallowing solids  S/S recommended Dysphagia mechanical soft   esophagogram  showing Filling defects in the mid esophagus with irregular mucosal contour  EGD today dx 3 years ago via EGD  worsening difficulty to swallowing solids  S/S recommended Dysphagia mechanical soft   esophagogram  showing Filling defects in the mid esophagus with irregular mucosal contour  EGD shows gastritis, oesophageal mass, duodenal ulcer, recommended ppi, pathology testing  Oncology eval with Dr Littlejohn, will do Ct abdomen and pelvis w/wo oral contrast

## 2019-05-06 NOTE — DISCHARGE NOTE PROVIDER - HOSPITAL COURSE
75 y/o M with PMHx of brown's esophagus, HTN, DM, HLD, and hypothyroidism came in due to weakness presents with encephalopathy WBC 28K and sepsis of unknown etiology. Empirically started on abx and leukocytosis improved. Pt also with behavioral problems of restlessness and confusion. Pt with urinary retention and was placed on Jiménez cath. Pt with dysphagia and hx of roula esophagus. Patient seen by the SSS, recommended Dysphagia mechanical soft diet. Had     esophagogram showing filling defects in the mid esophagus with irregular mucosal contour. EGD shows gastritis, oesophageal mass, duodenal ulcer, recommended ppi, pathology testing------.  Ct abdomen and pelvis w/wo oral contrast shows--------------. Had BLANCHE, resolved with IVF and by holding meds, renal U./s wnl.. Other home meds continued. Had  urinary retention so flomax started.     PT saw, recommended rehab. Patient stable to be discharged, plan discussed with attending. 75 y/o M with PMHx of brown's esophagus, HTN, DM, HLD, and hypothyroidism came in due to weakness presents with encephalopathy WBC 28K and sepsis of unknown etiology. Empirically started on abx and leukocytosis improved. Pt also with behavioral problems of restlessness and confusion. Pt with urinary retention and was placed on Jiménez cath. Pt with dysphagia and hx of roula esophagus. Patient seen by the SSS, recommended Dysphagia mechanical soft diet. Had esophagogram showing filling defects in the mid esophagus with irregular mucosal contour. EGD shows gastritis, oesophageal mass, duodenal ulcer, recommended ppi, pathology testing, can be followed outpatient.  Ct abdomen and pelvis w/wo oral contrast shows oesophageal thickening, mass, liver lesion, lymphadenopathy. Seen by oncologist, further w.up pending per path, also need bone scan. Had BLANCHE, resolved with IVF and by holding meds, renal U./s wnl.. Other home meds continued. Had  urinary retention so flomax started. PT saw, recommended rehab. Patient stable to be discharged, plan discussed with attending. Outpatient f/up with oncologist, path f/up. 73 y/o M with PMHx of brown's esophagus, HTN, DM, HLD, and hypothyroidism came in due to weakness presents with encephalopathy WBC 28K and sepsis of unknown etiology. Empirically started on abx and leukocytosis improved. Pt also with behavioral problems of restlessness and confusion. Pt with urinary retention and was placed on Jiménez cath. Pt with dysphagia and hx of roula esophagus. Patient seen by the SSS, recommended Dysphagia mechanical soft diet. Had esophagogram showing filling defects in the mid esophagus with irregular mucosal contour. EGD shows gastritis, oesophageal mass, duodenal ulcer, recommended ppi, pathology testing, Ct abdomen and pelvis w/wo oral contrast shows oesophageal thickening, mass, liver lesion, lymphadenopathy. Seen by oncologist, further w.up pending per path,-----------------, could be metastatic oesohageal ca. Had BLANCHE, resolved with IVF and by holding meds, renal U./s wnl.. Other home meds continued. Had  urinary retention so flomax started. PT saw, recommended rehab. Patient stable to be discharged, plan discussed with attending. Outpatient f/up with oncologist. 75 y/o M with PMHx of brown's esophagus, HTN, DM, HLD, and hypothyroidism came in due to weakness presents with encephalopathy WBC 28K and sepsis of unknown etiology. Empirically started on abx and leukocytosis improved. Pt also with behavioral problems of restlessness and confusion. Pt with urinary retention and was placed on Jiménez cath. Pt with dysphagia and hx of roula esophagus. Patient seen by the SSS, recommended Dysphagia mechanical soft diet. Had esophagogram showing filling defects in the mid esophagus with irregular mucosal contour. EGD shows gastritis, oesophageal mass, duodenal ulcer, recommended ppi, pathology testing, Ct abdomen and pelvis w/wo oral contrast shows oesophageal thickening, mass, liver lesion, lymphadenopathy. Seen by oncologist, pathology report mentions mild chronic gastritis no H pylori, proximal and distal oesophagus with glandular metaplasia, recommended re biopsy if clinically indicated. Had BLANCHE, resolved with IVF and by holding meds, renal U./s wnl.. Other home meds continued. Had  urinary retention so flomax started. PT saw, recommended rehab. Patient stable to be discharged, plan discussed with attending. Outpatient f/up with oncologist. 75 y/o M with PMHx of brown's esophagus, HTN, DM, HLD, and hypothyroidism came in due to weakness presents with encephalopathy WBC 28K and sepsis of unknown etiology. Empirically started on abx and leukocytosis improved. Pt also with behavioral problems of restlessness and confusion. Pt with urinary retention and was placed on Jiménez cath. Pt with dysphagia and hx of roula esophagus. Patient seen by the SSS, recommended Dysphagia mechanical soft diet. Had esophagogram showing filling defects in the mid esophagus with irregular mucosal contour. EGD shows gastritis, oesophageal mass, duodenal ulcer, recommended ppi. Ct abdomen and pelvis w/wo oral contrast shows oesophageal thickening, mass, liver lesion, lymphadenopathy. Seen by oncologist,  path reports with gastric biopsy shows chronic mild gastritis, proximal and distal oesophageal biopsy with glandular metaplasia. Due to inconclusive results, re biopsy recommended. Patient had EGD again on 5/9, oesophageal mass seen and biopsies sent. Had BLANCHE, resolved with IVF and by holding meds, renal U./s wnl.. Other home meds continued. Had  urinary retention so flomax started. PT saw, recommended rehab. Patient stable to be discharged, plan discussed with attending. Outpatient f/up with oncologist recommedned to f/up repeat biopsy result. 75 y/o M with PMHx of brown's esophagus, HTN, DM, HLD, and hypothyroidism came in due to weakness presents with encephalopathy WBC 28K and sepsis of unknown etiology. Empirically started on abx and leukocytosis improved. Pt also with behavioral problems of restlessness and confusion. Pt with urinary retention and was placed on Jiménez cath. Pt with dysphagia and hx of roula esophagus. Patient seen by the SSS, recommended Dysphagia mechanical soft diet. Had esophagogram showing filling defects in the mid esophagus with irregular mucosal contour. EGD shows gastritis, oesophageal mass, duodenal ulcer, recommended ppi. Ct abdomen and pelvis w/wo oral contrast shows oesophageal thickening, mass, liver lesion, lymphadenopathy. Seen by oncologist,  path reports with gastric biopsy shows chronic mild gastritis, proximal and distal oesophageal biopsy with glandular metaplasia. Due to inconclusive results, re biopsy recommended. Patient had EGD again on 5/9, oesophageal mass seen and biopsies sent. Had BLANCHE, resolved with IVF and by holding meds, renal U./s wnl.. Other home meds continued. Had  urinary retention so flomax started. PT saw, recommended rehab. Patient stable to be discharged, plan discussed with attending. Outpatient f/up with oncologist recommedned to f/up repeat biopsy result.             ATTENDING ADDENDUM:    The final discharge diagnoses include         1. esophageal  mass with lymphadenopathy likely malignancy; unknown type and etiology    2. Acute renal failure presumed ATN    3. Brown esophagus    4. Metabolic encephalopathy- resolved    5. Lactic acidosis presumed due to dehydration    6. dysphagia likely due to esophageal abnormality                 Surgical Pathology Report: ACCESSION No:  [id number]                1    Surgical Final Report: Final Diagnosis        Esophageal mass; biopsy:    - Fragments of cardiac-type mucosa with foci of intestinal    metaplasia and chronic mildly active gastritis. See comment.    - Fragments of necrotic tissue with inflammatory cell    infiltrate.    - PASF stain is negative for fungal microorganisms.        Verified by: Alix Christensen MD    (Electronic Signature)    Reported on: 05/13/19 10:42 EDT, [address] [place] [address], [place] [address]    _________________________________________________________________        Comment    The findings may represent Brown's esophagus, if originated    from the tubular portion of the esophagus, or intestinal    metaplasia in the setting of chronic carditis. Clinical    correlation is required. No dysplasia identified.        Clinical History    Esophageal mass    Operation performed: EGD        Specimen(s) Submitted    1     Esophageal mass bx        Gross Description    1.  The specimen is received in formalin and the specimen    container is labeled: Esophageal mass biopsy.  It consists of    four fragments of tan lobulated soft tissue ranging from 0.1-0.4    cm in greatest dimension.  PAS stain is requested.  Entirely    submitted.  One cassette.

## 2019-05-06 NOTE — DISCHARGE NOTE PROVIDER - CARE PROVIDER_API CALL
Anu Littlejohn)  HematologyOncology; Internal Medicine  [address] [facility], [address]  [place] [address]  Phone: [phone]  Fax: [phone]  Follow Up Time:

## 2019-05-06 NOTE — CONSULT NOTE ADULT - SUBJECTIVE AND OBJECTIVE BOX
HPI: 73 y/o M with PMHx of brown's esophagus, HTN, DM, HLD, and hypothyroidism came in due to weakness.   Patient states that he always had difficulty swallowing solids but since 1 week it has been getting worse. He has not been eating or drinking much since then and therefore feels extremely weak. Also states having cough productive of orange sputum since 2-3 days. No fever. No sick contacts. No SOB. Today, he went to his PMD for regular check up and was sent to ER because he "looked sick". He states feeling better now but is still very thirsty. Denies any other complaints including abdominal pain, nausea, vomiting, dysuria, hematuria, or changes in urine output.  Patient has been treated for pneumonia. CT Chest revealed esophageal thickening. This led to an EGD on 5/6/19, which revealed esophageal mass. Oncology was consulted for same.  5/6/2019: I met with patient this evening. He mentions he has no appetite. Feels like food gets stuck. He has been aware of his brown's. He also mentions he feels depressed, and concerned about possibility of cancer. He has had weight loss of around 10 lbs over last 2 months.    ROS: negative except as per hpi  PMH / PSH: as per hpi  Meds: reviewed in emr  Allergies: penicillin  Social hist: Non smoker. no alcohol..  Family hist: Mother with h/o breast cancer. Aunt with h/o Lung cancer *(smoker)    ICU Vital Signs Last 24 Hrs  T(C): 37.1 (06 May 2019 14:20), Max: 37.2 (05 May 2019 20:12)  T(F): 98.8 (06 May 2019 14:20), Max: 98.9 (05 May 2019 20:12)  HR: 78 (06 May 2019 14:20) (77 - 94)  BP: 122/64 (06 May 2019 14:20) (122/64 - 148/80)  BP(mean): --  ABP: --  ABP(mean): --  RR: 17 (06 May 2019 14:20) (17 - 18)  SpO2: 97% (06 May 2019 14:20) (92% - 98%)    Gen: A&O * 3  GI: No HSM. Non-tender  CVS: S1S2  Resp: ctabl  Ext: no edema  CNS: no focal deficits      CBC Full  -  ( 06 May 2019 07:46 )  WBC Count : 9.67 K/uL  RBC Count : 3.41 M/uL  Hemoglobin : 10.1 g/dL  Hematocrit : 31.3 %  Platelet Count - Automated : 276 K/uL  Mean Cell Volume : 91.8 fl  Mean Cell Hemoglobin : 29.6 pg  Mean Cell Hemoglobin Concentration : 32.3 gm/dL  Auto Neutrophil # : x  Auto Lymphocyte # : x  Auto Monocyte # : x  Auto Eosinophil # : x  Auto Basophil # : x  Auto Neutrophil % : x  Auto Lymphocyte % : x  Auto Monocyte % : x  Auto Eosinophil % : x  Auto Basophil % : x      < from: CT Chest No Cont (05.01.19 @ 16:15) >    IMPRESSION:     1.  Small bilateral pleural effusions and subsegmental passive   atelectasis.     2.  Mildly dilated esophagus with severe wall thickening and moderate   amount of retained ingested material.        < end of copied text >        < from: CT Abdomen and Pelvis w/ Oral Cont and w/ IV Cont (05.06.19 @ 17:55) >  IMPRESSION:    1. Irregular wall thickening of the visualized portion of the distal   esophagus is identified with intraluminal air/fluid/debris noted.   Esophageal malignancy is not excluded and endoscopic correlation is   recommended.  2. A 2.0 cm hypodense mass is identified within the right hepatic lobe;   there is an additional 1 cm hypodense lesion identified within the   superior aspect of the right hepatic lobe. These lesions within the   hepatic parenchyma are suspicious for hepatic parenchymal neoplastic   disease. Recommend contrast enhanced abdominal MR for further assessment.   A 2.3 cm space-occupying lesion is identified within the upper pole   region of the left kidney, which can be further characterized on   recommended contrast enhanced abdominal MR.  3. A 3.8 x 3.2 cm retrocrural lymph node is identified which appears   necrotic. There are multiple abnormal in appearance mesenteric soft   tissue masses measuring up to 2.9 x 2.6 cm, likely neoplastic.   Para-aortic lymphadenopathy measures up to 1.7 x 1.1 cm. Portocaval   lymphadenopathy measures up to 1.5 x 1.0 cm.        < end of copied text >        5/6/2019: EGD:   Esophagus	Extending from 21 cm (from the incisors) to 32 cm (from the incisors) a friable, ulcerated, mass. Biopsies at the proximal edge of the ulcer [3] and distal edge of the ulcer taken [2]  EG-junction	As above   Cardia	Normal  Body	Normal   Antrum	-Mild erythema and edema. Random biopsy taken. [1]  Pylorus	Normal  Duodenal Bulb	Along the anterior wall a 1.5 clean based ulcer.   2nd portion	Normal  3rd portion	Normal  Date and time:5/6/2019 10:26:48 AM  EBL:0  Impression: 1- Esophageal mass 2- Duodenal ulcer 3- Gastritis HPI: 75 y/o M with PMHx of brown's esophagus, HTN, DM, HLD, and hypothyroidism came in due to weakness.   Patient states that he always had difficulty swallowing solids but since 1 week it has been getting worse. He has not been eating or drinking much since then and therefore feels extremely weak. Also states having cough productive of orange sputum since 2-3 days. No fever. No sick contacts. No SOB. Today, he went to his PMD for regular check up and was sent to ER because he "looked sick". He states feeling better now but is still very thirsty. Denies any other complaints including abdominal pain, nausea, vomiting, dysuria, hematuria, or changes in urine output.  Patient has been treated for pneumonia. CT Chest revealed esophageal thickening. This led to an EGD on 5/6/19, which revealed esophageal mass. Oncology was consulted for same.  5/6/2019: I met with patient this evening. He mentions he has no appetite. Feels like food gets stuck. He has been aware of his brown's. He also mentions he feels depressed, and concerned about possibility of cancer. He has had weight loss of around 10 lbs over last 2 months.    ROS: negative except as per hpi  PMH / PSH: as per hpi  Meds: reviewed in emr  Allergies: penicillin  Social hist: Non smoker. no alcohol..  Family hist: Mother with h/o breast cancer. Aunt with h/o Lung cancer *(smoker)    ICU Vital Signs Last 24 Hrs  T(C): 37.1 (06 May 2019 14:20), Max: 37.2 (05 May 2019 20:12)  T(F): 98.8 (06 May 2019 14:20), Max: 98.9 (05 May 2019 20:12)  HR: 78 (06 May 2019 14:20) (77 - 94)  BP: 122/64 (06 May 2019 14:20) (122/64 - 148/80)  BP(mean): --  ABP: --  ABP(mean): --  RR: 17 (06 May 2019 14:20) (17 - 18)  SpO2: 97% (06 May 2019 14:20) (92% - 98%)    Gen: A&O * 3  GI: No HSM. Non-tender  CVS: S1S2  Resp: ctabl  Ext: no edema  CNS: no focal deficits      CBC Full  -  ( 06 May 2019 07:46 )  WBC Count : 9.67 K/uL  RBC Count : 3.41 M/uL  Hemoglobin : 10.1 g/dL  Hematocrit : 31.3 %  Platelet Count - Automated : 276 K/uL  Mean Cell Volume : 91.8 fl  Mean Cell Hemoglobin : 29.6 pg  Mean Cell Hemoglobin Concentration : 32.3 gm/dL  Auto Neutrophil # : x  Auto Lymphocyte # : x  Auto Monocyte # : x  Auto Eosinophil # : x  Auto Basophil # : x  Auto Neutrophil % : x  Auto Lymphocyte % : x  Auto Monocyte % : x  Auto Eosinophil % : x  Auto Basophil % : x      < from: CT Chest No Cont (05.01.19 @ 16:15) >    IMPRESSION:     1.  Small bilateral pleural effusions and subsegmental passive   atelectasis.     2.  Mildly dilated esophagus with severe wall thickening and moderate   amount of retained ingested material.        < end of copied text >        < from: CT Abdomen and Pelvis w/ Oral Cont and w/ IV Cont (05.06.19 @ 17:55) >  IMPRESSION:    1. Irregular wall thickening of the visualized portion of the distal   esophagus is identified with intraluminal air/fluid/debris noted.   Esophageal malignancy is not excluded and endoscopic correlation is   recommended.  2. A 2.0 cm hypodense mass is identified within the right hepatic lobe;   there is an additional 1 cm hypodense lesion identified within the   superior aspect of the right hepatic lobe. These lesions within the   hepatic parenchyma are suspicious for hepatic parenchymal neoplastic   disease. Recommend contrast enhanced abdominal MR for further assessment.   A 2.3 cm space-occupying lesion is identified within the upper pole   region of the left kidney, which can be further characterized on   recommended contrast enhanced abdominal MR.  3. A 3.8 x 3.2 cm retrocrural lymph node is identified which appears   necrotic. There are multiple abnormal in appearance mesenteric soft   tissue masses measuring up to 2.9 x 2.6 cm, likely neoplastic.   Para-aortic lymphadenopathy measures up to 1.7 x 1.1 cm. Portocaval   lymphadenopathy measures up to 1.5 x 1.0 cm.        < end of copied text >        5/6/2019: EGD:   Esophagus	Extending from 21 cm (from the incisors) to 32 cm (from the incisors) a friable, ulcerated, mass. Biopsies at the proximal edge of the ulcer [3] and distal edge of the ulcer taken [2]  EG-junction	As above   Cardia	Normal  Body	Normal   Antrum	-Mild erythema and edema. Random biopsy taken. [1]  Pylorus	Normal  Duodenal Bulb	Along the anterior wall a 1.5 clean based ulcer.   2nd portion	Normal  3rd portion	Normal  Date and time:5/6/2019 10:26:48 AM  EBL:0  Impression: 1- Esophageal mass 2- Duodenal ulcer 3- Gastritis

## 2019-05-07 DIAGNOSIS — D64.9 ANEMIA, UNSPECIFIED: ICD-10-CM

## 2019-05-07 DIAGNOSIS — K22.9 DISEASE OF ESOPHAGUS, UNSPECIFIED: ICD-10-CM

## 2019-05-07 LAB
CEA SERPL-MCNC: 1.5 NG/ML — SIGNIFICANT CHANGE UP (ref 0–3.8)
FERRITIN SERPL-MCNC: 528 NG/ML — HIGH (ref 30–400)
FOLATE SERPL-MCNC: 7 NG/ML — SIGNIFICANT CHANGE UP
GLUCOSE BLDC GLUCOMTR-MCNC: 121 MG/DL — HIGH (ref 70–99)
GLUCOSE BLDC GLUCOMTR-MCNC: 150 MG/DL — HIGH (ref 70–99)
GLUCOSE BLDC GLUCOMTR-MCNC: 187 MG/DL — HIGH (ref 70–99)
GLUCOSE BLDC GLUCOMTR-MCNC: 195 MG/DL — HIGH (ref 70–99)
IRON SATN MFR SERPL: 23 % — SIGNIFICANT CHANGE UP (ref 20–55)
IRON SATN MFR SERPL: 47 UG/DL — LOW (ref 65–170)
PHOSPHATE SERPL-MCNC: 2.6 MG/DL — SIGNIFICANT CHANGE UP (ref 2.5–4.5)
TIBC SERPL-MCNC: 203 UG/DL — LOW (ref 250–450)
TRANSFERRIN SERPL-MCNC: 164 MG/DL — LOW (ref 200–360)
UIBC SERPL-MCNC: 156 UG/DL — SIGNIFICANT CHANGE UP (ref 110–370)

## 2019-05-07 PROCEDURE — 99233 SBSQ HOSP IP/OBS HIGH 50: CPT | Mod: GC

## 2019-05-07 RX ORDER — ALBUTEROL 90 UG/1
1 AEROSOL, METERED ORAL EVERY 4 HOURS
Qty: 0 | Refills: 0 | Status: COMPLETED | OUTPATIENT
Start: 2019-05-07 | End: 2020-04-04

## 2019-05-07 RX ORDER — ALBUTEROL 90 UG/1
1 AEROSOL, METERED ORAL EVERY 4 HOURS
Qty: 0 | Refills: 0 | Status: DISCONTINUED | OUTPATIENT
Start: 2019-05-07 | End: 2019-05-09

## 2019-05-07 RX ORDER — POLYETHYLENE GLYCOL 3350 17 G/17G
17 POWDER, FOR SOLUTION ORAL DAILY
Qty: 0 | Refills: 0 | Status: DISCONTINUED | OUTPATIENT
Start: 2019-05-07 | End: 2019-05-09

## 2019-05-07 RX ORDER — TIOTROPIUM BROMIDE 18 UG/1
1 CAPSULE ORAL; RESPIRATORY (INHALATION) DAILY
Qty: 0 | Refills: 0 | Status: DISCONTINUED | OUTPATIENT
Start: 2019-05-07 | End: 2019-05-09

## 2019-05-07 RX ORDER — SENNA PLUS 8.6 MG/1
2 TABLET ORAL AT BEDTIME
Qty: 0 | Refills: 0 | Status: DISCONTINUED | OUTPATIENT
Start: 2019-05-07 | End: 2019-05-09

## 2019-05-07 RX ADMIN — Medication 25 MILLIGRAM(S): at 05:25

## 2019-05-07 RX ADMIN — ATORVASTATIN CALCIUM 40 MILLIGRAM(S): 80 TABLET, FILM COATED ORAL at 21:49

## 2019-05-07 RX ADMIN — SENNA PLUS 2 TABLET(S): 8.6 TABLET ORAL at 22:51

## 2019-05-07 RX ADMIN — HEPARIN SODIUM 5000 UNIT(S): 5000 INJECTION INTRAVENOUS; SUBCUTANEOUS at 05:25

## 2019-05-07 RX ADMIN — ALBUTEROL 1 PUFF(S): 90 AEROSOL, METERED ORAL at 21:50

## 2019-05-07 RX ADMIN — Medication 1 DROP(S): at 11:59

## 2019-05-07 RX ADMIN — PANTOPRAZOLE SODIUM 40 MILLIGRAM(S): 20 TABLET, DELAYED RELEASE ORAL at 05:25

## 2019-05-07 RX ADMIN — TAMSULOSIN HYDROCHLORIDE 0.8 MILLIGRAM(S): 0.4 CAPSULE ORAL at 21:49

## 2019-05-07 RX ADMIN — Medication 25 MILLIGRAM(S): at 17:03

## 2019-05-07 RX ADMIN — Medication 1 TABLET(S): at 08:49

## 2019-05-07 RX ADMIN — Medication 1: at 11:59

## 2019-05-07 RX ADMIN — Medication 75 MICROGRAM(S): at 05:25

## 2019-05-07 RX ADMIN — HEPARIN SODIUM 5000 UNIT(S): 5000 INJECTION INTRAVENOUS; SUBCUTANEOUS at 17:03

## 2019-05-07 RX ADMIN — AMLODIPINE BESYLATE 10 MILLIGRAM(S): 2.5 TABLET ORAL at 05:25

## 2019-05-07 RX ADMIN — OLANZAPINE 5 MILLIGRAM(S): 15 TABLET, FILM COATED ORAL at 11:59

## 2019-05-07 NOTE — PROGRESS NOTE ADULT - ATTENDING COMMENTS
Patient was seen and examined by myself. Case was discussed with house staff in details. I have reviewed and agree with the plan as outlined above with edits where appropriate.    Vital Signs Last 24 Hrs  T(C): 36.9 (07 May 2019 20:32), Max: 36.9 (07 May 2019 20:32)  T(F): 98.4 (07 May 2019 20:32), Max: 98.4 (07 May 2019 20:32)  HR: 70 (07 May 2019 20:32) (70 - 79)  BP: 115/46 (07 May 2019 20:32) (115/46 - 149/60)  BP(mean): --  RR: 18 (07 May 2019 20:32) (17 - 18)  SpO2: 98% (07 May 2019 20:32) (92% - 99%)                            10.1   9.67  )-----------( 276      ( 06 May 2019 07:46 )             31.3     Carcinoembryonic Antigen: 1.5:     05-06    139  |  110<H>  |  11  ----------------------------<  156<H>  3.6   |  22  |  1.45<H>    Ca    8.5      06 May 2019 07:46  Phos  2.6     05-07  Mg     2.2     05-06      A/P: 75 y/o M with   1. 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 Acute renal failure possibly ATN  3. Disseminated disease with lymphadenopathy likely disseminated cancer  4. Hypokalemia  5. Essential HTN  6. Urinary retention- resolved  7. Metabolic encephalopathy- resolving  8. Lactic acidosis presumed due to dehydration  9. dysphagia likely due to esophageal abnormality   10 Leukocytosis    awaiting pathology report  Pain control  Other plan as outlined above

## 2019-05-07 NOTE — PROGRESS NOTE ADULT - PROBLEM SELECTOR PLAN 1
S/p EGD shows gastritis, oesophageal mass, duodenal ulcer, recommended ppi, pathology testing  Oncology eval appreciated   Ct abdomen and pelvis w/wo oral contrast noted above, concern for likely Metastatic Esophageal Ca in setting of brown's, with lesion in liver,lympadenopathy  F/up CEA  Further w.up pending per pathology  Need bone scan also

## 2019-05-07 NOTE — PROGRESS NOTE ADULT - SUBJECTIVE AND OBJECTIVE BOX
Patient is a 74y old  Male who presents with a chief complaint of Sepsis and dehydration (06 May 2019 19:03)      INTERVAL HPI/OVERNIGHT EVENTS: S/p EGd, currently stable     MEDICATIONS  (STANDING):  ALBUTerol/ipratropium for Nebulization 3 milliLiter(s) Nebulizer every 6 hours  amLODIPine   Tablet 10 milliGRAM(s) Oral daily  artificial  tears Solution 1 Drop(s) Both EYES daily  atorvastatin 40 milliGRAM(s) Oral at bedtime  dextrose 5%. 1000 milliLiter(s) (50 mL/Hr) IV Continuous <Continuous>  heparin  Injectable 5000 Unit(s) SubCutaneous every 12 hours  insulin lispro (HumaLOG) corrective regimen sliding scale   SubCutaneous three times a day before meals  levothyroxine 75 MICROGram(s) Oral daily  metoprolol tartrate 25 milliGRAM(s) Oral two times a day  OLANZapine 5 milliGRAM(s) Oral daily  pantoprazole    Tablet 40 milliGRAM(s) Oral before breakfast  tamsulosin 0.8 milliGRAM(s) Oral at bedtime  tiotropium 18 MICROgram(s) Capsule 1 Capsule(s) Inhalation daily    MEDICATIONS  (PRN):  aluminum hydroxide/magnesium hydroxide/simethicone Suspension 30 milliLiter(s) Oral every 6 hours PRN Dyspepsia  glucagon  Injectable 1 milliGRAM(s) IntraMuscular once PRN Glucose LESS THAN 70 milligrams/deciliter  guaiFENesin   Syrup  (Sugar-Free) 100 milliGRAM(s) Oral every 6 hours PRN Cough      Allergies    penicillin (Rash)    Intolerances      __________________________________________________  REVIEW OF SYSTEMS:    CONSTITUTIONAL: No fever,   EYES: no acute visual disturbances  NECK: No pain or stiffness  RESPIRATORY: No cough; No shortness of breath  CARDIOVASCULAR: No chest pain, no palpitations  GASTROINTESTINAL: No pain. No nausea or vomiting; No diarrhea   NEUROLOGICAL: No headache or numbness, no tremors      Vital Signs Last 24 Hrs  T(C): 36.7 (07 May 2019 05:00), Max: 37.1 (06 May 2019 14:20)  T(F): 98.1 (07 May 2019 05:00), Max: 98.8 (06 May 2019 14:20)  HR: 79 (07 May 2019 05:00) (78 - 81)  BP: 149/60 (07 May 2019 05:00) (122/64 - 152/78)  BP(mean): --  RR: 17 (07 May 2019 05:00) (17 - 17)  SpO2: 99% (07 May 2019 05:00) (97% - 99%)    ________________________________________________  PHYSICAL EXAM:  GENERAL: NAD, awake   HEAD:  , Normocephalic  EYES:  conjunctiva and sclera clear  NECK: Supple, No JVD    NERVOUS SYSTEM:  Alert & Oriented X3,   CHEST/LUNG: Clear to auscuitation bilaterally;   HEART: S1 S2+;   ABDOMEN: Soft, Nontender, Nondistended; Bowel sounds present  EXTREMITIES: no cyanosis; no edema; no calf tenderness    _________________________________________________  LABS:             No new labs       CAPILLARY BLOOD GLUCOSE      POCT Blood Glucose.: 121 mg/dL (07 May 2019 07:51)  POCT Blood Glucose.: 139 mg/dL (06 May 2019 20:58)  POCT Blood Glucose.: 134 mg/dL (06 May 2019 16:38)  POCT Blood Glucose.: 144 mg/dL (06 May 2019 11:45)      < from: CT Abdomen and Pelvis w/ Oral Cont and w/ IV Cont (05.06.19 @ 17:55) >  IMPRESSION:    1. Irregular wall thickening of the visualized portion of the distal   esophagus is identified with intraluminal air/fluid/debris noted.   Esophageal malignancy is not excluded and endoscopic correlation is   recommended.  2. A 2.0 cm hypodense mass is identified within the right hepatic lobe;   there is an additional 1 cm hypodense lesion identified within the   superior aspect of the right hepatic lobe. These lesions within the   hepatic parenchyma are suspicious for hepatic parenchymal neoplastic   disease. Recommend contrast enhanced abdominal MR for further assessment.   A 2.3 cm space-occupying lesion is identified within the upper pole   region of the left kidney, which can be further characterized on   recommended contrast enhanced abdominal MR.  3. A 3.8 x 3.2 cm retrocrural lymph node is identified which appears   necrotic. There are multiple abnormal in appearance mesenteric soft   tissue masses measuring up to 2.9 x 2.6 cm, likely neoplastic.   Para-aortic lymphadenopathy measures up to 1.7 x 1.1 cm. Portocaval   lymphadenopathy measures up to 1.5 x 1.0 cm.

## 2019-05-08 LAB
ANION GAP SERPL CALC-SCNC: 8 MMOL/L — SIGNIFICANT CHANGE UP (ref 5–17)
BUN SERPL-MCNC: 15 MG/DL — SIGNIFICANT CHANGE UP (ref 7–18)
CALCIUM SERPL-MCNC: 8.9 MG/DL — SIGNIFICANT CHANGE UP (ref 8.4–10.5)
CHLORIDE SERPL-SCNC: 108 MMOL/L — SIGNIFICANT CHANGE UP (ref 96–108)
CO2 SERPL-SCNC: 23 MMOL/L — SIGNIFICANT CHANGE UP (ref 22–31)
CREAT SERPL-MCNC: 1.63 MG/DL — HIGH (ref 0.5–1.3)
GLUCOSE BLDC GLUCOMTR-MCNC: 136 MG/DL — HIGH (ref 70–99)
GLUCOSE BLDC GLUCOMTR-MCNC: 154 MG/DL — HIGH (ref 70–99)
GLUCOSE BLDC GLUCOMTR-MCNC: 156 MG/DL — HIGH (ref 70–99)
GLUCOSE BLDC GLUCOMTR-MCNC: 201 MG/DL — HIGH (ref 70–99)
GLUCOSE SERPL-MCNC: 129 MG/DL — HIGH (ref 70–99)
POTASSIUM SERPL-MCNC: 3.7 MMOL/L — SIGNIFICANT CHANGE UP (ref 3.5–5.3)
POTASSIUM SERPL-SCNC: 3.7 MMOL/L — SIGNIFICANT CHANGE UP (ref 3.5–5.3)
SODIUM SERPL-SCNC: 139 MMOL/L — SIGNIFICANT CHANGE UP (ref 135–145)

## 2019-05-08 PROCEDURE — 99239 HOSP IP/OBS DSCHRG MGMT >30: CPT

## 2019-05-08 PROCEDURE — 99232 SBSQ HOSP IP/OBS MODERATE 35: CPT

## 2019-05-08 RX ORDER — SODIUM CHLORIDE 9 MG/ML
1000 INJECTION INTRAMUSCULAR; INTRAVENOUS; SUBCUTANEOUS
Qty: 0 | Refills: 0 | Status: DISCONTINUED | OUTPATIENT
Start: 2019-05-08 | End: 2019-05-09

## 2019-05-08 RX ORDER — METFORMIN HYDROCHLORIDE 850 MG/1
1 TABLET ORAL
Qty: 30 | Refills: 0 | OUTPATIENT
Start: 2019-05-08 | End: 2019-06-06

## 2019-05-08 RX ORDER — METFORMIN HYDROCHLORIDE 850 MG/1
0 TABLET ORAL
Qty: 0 | Refills: 0 | COMMUNITY

## 2019-05-08 RX ORDER — FOLIC ACID 0.8 MG
1 TABLET ORAL DAILY
Qty: 0 | Refills: 0 | Status: DISCONTINUED | OUTPATIENT
Start: 2019-05-08 | End: 2019-05-09

## 2019-05-08 RX ORDER — FOLIC ACID 0.8 MG
1 TABLET ORAL
Qty: 0 | Refills: 0 | COMMUNITY
Start: 2019-05-08

## 2019-05-08 RX ADMIN — Medication 25 MILLIGRAM(S): at 05:22

## 2019-05-08 RX ADMIN — Medication 75 MICROGRAM(S): at 05:22

## 2019-05-08 RX ADMIN — TIOTROPIUM BROMIDE 1 CAPSULE(S): 18 CAPSULE ORAL; RESPIRATORY (INHALATION) at 11:53

## 2019-05-08 RX ADMIN — Medication 2: at 17:25

## 2019-05-08 RX ADMIN — ALBUTEROL 1 PUFF(S): 90 AEROSOL, METERED ORAL at 05:22

## 2019-05-08 RX ADMIN — ATORVASTATIN CALCIUM 40 MILLIGRAM(S): 80 TABLET, FILM COATED ORAL at 22:05

## 2019-05-08 RX ADMIN — ALBUTEROL 1 PUFF(S): 90 AEROSOL, METERED ORAL at 10:00

## 2019-05-08 RX ADMIN — Medication 1 DROP(S): at 11:51

## 2019-05-08 RX ADMIN — PANTOPRAZOLE SODIUM 40 MILLIGRAM(S): 20 TABLET, DELAYED RELEASE ORAL at 05:22

## 2019-05-08 RX ADMIN — AMLODIPINE BESYLATE 10 MILLIGRAM(S): 2.5 TABLET ORAL at 05:22

## 2019-05-08 RX ADMIN — OLANZAPINE 5 MILLIGRAM(S): 15 TABLET, FILM COATED ORAL at 11:51

## 2019-05-08 RX ADMIN — Medication 1: at 11:53

## 2019-05-08 RX ADMIN — Medication 25 MILLIGRAM(S): at 17:25

## 2019-05-08 RX ADMIN — HEPARIN SODIUM 5000 UNIT(S): 5000 INJECTION INTRAVENOUS; SUBCUTANEOUS at 17:25

## 2019-05-08 RX ADMIN — TAMSULOSIN HYDROCHLORIDE 0.8 MILLIGRAM(S): 0.4 CAPSULE ORAL at 22:05

## 2019-05-08 RX ADMIN — ALBUTEROL 1 PUFF(S): 90 AEROSOL, METERED ORAL at 14:43

## 2019-05-08 RX ADMIN — ALBUTEROL 1 PUFF(S): 90 AEROSOL, METERED ORAL at 22:04

## 2019-05-08 RX ADMIN — HEPARIN SODIUM 5000 UNIT(S): 5000 INJECTION INTRAVENOUS; SUBCUTANEOUS at 05:22

## 2019-05-08 RX ADMIN — Medication 1 MILLIGRAM(S): at 11:51

## 2019-05-08 RX ADMIN — SENNA PLUS 2 TABLET(S): 8.6 TABLET ORAL at 22:04

## 2019-05-08 NOTE — PROGRESS NOTE ADULT - SUBJECTIVE AND OBJECTIVE BOX
S: patient seen this morning. Mentions he feels slightly better. Also mentions he has been able to eat slightly more over the last 2 days. Events noted.     ICU Vital Signs Last 24 Hrs  T(C): 36.9 (08 May 2019 05:07), Max: 36.9 (07 May 2019 20:32)  T(F): 98.4 (08 May 2019 05:07), Max: 98.4 (07 May 2019 20:32)  HR: 74 (08 May 2019 05:07) (70 - 78)  BP: 130/78 (08 May 2019 05:07) (115/46 - 142/82)  BP(mean): --  ABP: --  ABP(mean): --  RR: 16 (08 May 2019 05:07) (16 - 18)  SpO2: 99% (08 May 2019 05:07) (92% - 99%)    Gen: A&O * 3  GI: No HSM. Non-tender  CVS: S1S2  Resp: ctabl  Ext: no edema  CNS: no focal deficits          < from: CT Chest No Cont (05.01.19 @ 16:15) >    IMPRESSION:     1.  Small bilateral pleural effusions and subsegmental passive   atelectasis.     2.  Mildly dilated esophagus with severe wall thickening and moderate   amount of retained ingested material.        < end of copied text >        < from: CT Abdomen and Pelvis w/ Oral Cont and w/ IV Cont (05.06.19 @ 17:55) >  IMPRESSION:    1. Irregular wall thickening of the visualized portion of the distal   esophagus is identified with intraluminal air/fluid/debris noted.   Esophageal malignancy is not excluded and endoscopic correlation is   recommended.  2. A 2.0 cm hypodense mass is identified within the right hepatic lobe;   there is an additional 1 cm hypodense lesion identified within the   superior aspect of the right hepatic lobe. These lesions within the   hepatic parenchyma are suspicious for hepatic parenchymal neoplastic   disease. Recommend contrast enhanced abdominal MR for further assessment.   A 2.3 cm space-occupying lesion is identified within the upper pole   region of the left kidney, which can be further characterized on   recommended contrast enhanced abdominal MR.  3. A 3.8 x 3.2 cm retrocrural lymph node is identified which appears   necrotic. There are multiple abnormal in appearance mesenteric soft   tissue masses measuring up to 2.9 x 2.6 cm, likely neoplastic.   Para-aortic lymphadenopathy measures up to 1.7 x 1.1 cm. Portocaval   lymphadenopathy measures up to 1.5 x 1.0 cm.        < end of copied text >        5/6/2019: EGD:   Esophagus	Extending from 21 cm (from the incisors) to 32 cm (from the incisors) a friable, ulcerated, mass. Biopsies at the proximal edge of the ulcer [3] and distal edge of the ulcer taken [2]  EG-junction	As above   Cardia	Normal  Body	Normal   Antrum	-Mild erythema and edema. Random biopsy taken. [1]  Pylorus	Normal  Duodenal Bulb	Along the anterior wall a 1.5 clean based ulcer.   2nd portion	Normal  3rd portion	Normal  Date and time:5/6/2019 10:26:48 AM  EBL:0  Impression: 1- Esophageal mass 2- Duodenal ulcer 3- Gastritis

## 2019-05-08 NOTE — PROGRESS NOTE ADULT - PROBLEM SELECTOR PLAN 9
Generalized anxiety disorder  C/w zyprexa  Patient is comfortable Generalized anxiety disorder  C/w Zyprexa  Patient is comfortable

## 2019-05-08 NOTE — PROGRESS NOTE ADULT - ASSESSMENT
# Esophageal Mass:  # Liver lesions  # Duodenal bulb ulcer  # Extensive Retro-peritoneal LNs and mesenteric masses  Concern for Metastatic Esophageal Ca in setting of brown's  Low Albumin  CEA 1.5    Recs:  -Await pathology report  -If AdenoCa confirmed on path, please send for Her-2, MSI, PD-L1 testing.   -Bone scan  -May need biopsy of Liver lesion  -Nutrition consult    # Anemia:  NCNC  WNL iron panel, b12, folate.    Case d/w Primary team Resident Physician.    Will continue to follow. Dr. Araujo covering for me for next 2 days

## 2019-05-08 NOTE — PROGRESS NOTE ADULT - SUBJECTIVE AND OBJECTIVE BOX
Subjective:   Anxious about results.     Objective:    MEDICATIONS  (STANDING):  ALBUTerol    90 MICROgram(s) HFA Inhaler 1 Puff(s) Inhalation every 4 hours  amLODIPine   Tablet 10 milliGRAM(s) Oral daily  artificial  tears Solution 1 Drop(s) Both EYES daily  atorvastatin 40 milliGRAM(s) Oral at bedtime  dextrose 5%. 1000 milliLiter(s) (50 mL/Hr) IV Continuous <Continuous>  folic acid 1 milliGRAM(s) Oral daily  heparin  Injectable 5000 Unit(s) SubCutaneous every 12 hours  insulin lispro (HumaLOG) corrective regimen sliding scale   SubCutaneous three times a day before meals  levothyroxine 75 MICROGram(s) Oral daily  metoprolol tartrate 25 milliGRAM(s) Oral two times a day  OLANZapine 5 milliGRAM(s) Oral daily  pantoprazole    Tablet 40 milliGRAM(s) Oral before breakfast  polyethylene glycol 3350 17 Gram(s) Oral daily  senna 2 Tablet(s) Oral at bedtime  sodium chloride 0.9%. 1000 milliLiter(s) (70 mL/Hr) IV Continuous <Continuous>  tamsulosin 0.8 milliGRAM(s) Oral at bedtime  tiotropium 18 MICROgram(s) Capsule 1 Capsule(s) Inhalation daily    MEDICATIONS  (PRN):  aluminum hydroxide/magnesium hydroxide/simethicone Suspension 30 milliLiter(s) Oral every 6 hours PRN Dyspepsia  glucagon  Injectable 1 milliGRAM(s) IntraMuscular once PRN Glucose LESS THAN 70 milligrams/deciliter  guaiFENesin   Syrup  (Sugar-Free) 100 milliGRAM(s) Oral every 6 hours PRN Cough              Vital Signs Last 24 Hrs  T(C): 36.9 (08 May 2019 05:07), Max: 36.9 (07 May 2019 20:32)  T(F): 98.4 (08 May 2019 05:07), Max: 98.4 (07 May 2019 20:32)  HR: 74 (08 May 2019 05:07) (70 - 78)  BP: 130/78 (08 May 2019 05:07) (115/46 - 142/82)  BP(mean): --  RR: 16 (08 May 2019 05:07) (16 - 18)  SpO2: 99% (08 May 2019 05:07) (92% - 99%)      General: r, NAD.  Cardiovascular:  RRR normal S1/S2, no murmur.  Respiratory:  CTA B/L, normal respiratory effort.   Abdominal:   soft, no masses or tenderness, normoactive BS, NT/ND, no HSM.  Extremities:   No clubbing or cyanosis, normal capillary refill, no edema.   Skin:   No rash, jaundice, lesions, eczema.         LABS:    05-08    139  |  108  |  15  ----------------------------<  129<H>  3.7   |  23  |  1.63<H>    Ca    8.9      08 May 2019 07:07  Phos  2.6     05-07            RADIOLOGY & ADDITIONAL TESTS:

## 2019-05-08 NOTE — PROGRESS NOTE ADULT - PROBLEM SELECTOR PLAN 1
Likely oesophageal mass with liver lesion, lymphadenopathy  Normal CEA  Further w.up pending per pathology Likely oesophageal mass with liver lesion, lymphadenopathy  Normal CEA  Further w.up pending per pathology, likely report will come tmrw as per pathologist

## 2019-05-08 NOTE — PROGRESS NOTE ADULT - ASSESSMENT
73 y/o M with PMHx of brown's esophagus, HTN, DM, HLD, and hypothyroidism came in due to weakness presents with encephalopathy WBC 28K and sepsis of unknown etiology. Empirically started on abx and leukocytosis improved. Pt also with behavioral problems of restlessness and confusion. Pt with urinary retention and was placed on Jiménez cath. Pt with dysphagia and hx of roula esophagus

## 2019-05-08 NOTE — PROGRESS NOTE ADULT - SUBJECTIVE AND OBJECTIVE BOX
Patient is a 74y old  Male who presents with a chief complaint of Sepsis and dehydration (07 May 2019 09:39)      INTERVAL HPI/OVERNIGHT EVENTS: no new complaints, awaiting path report    MEDICATIONS  (STANDING):  ALBUTerol    90 MICROgram(s) HFA Inhaler 1 Puff(s) Inhalation every 4 hours  amLODIPine   Tablet 10 milliGRAM(s) Oral daily  artificial  tears Solution 1 Drop(s) Both EYES daily  atorvastatin 40 milliGRAM(s) Oral at bedtime  dextrose 5%. 1000 milliLiter(s) (50 mL/Hr) IV Continuous <Continuous>  heparin  Injectable 5000 Unit(s) SubCutaneous every 12 hours  insulin lispro (HumaLOG) corrective regimen sliding scale   SubCutaneous three times a day before meals  levothyroxine 75 MICROGram(s) Oral daily  metoprolol tartrate 25 milliGRAM(s) Oral two times a day  OLANZapine 5 milliGRAM(s) Oral daily  pantoprazole    Tablet 40 milliGRAM(s) Oral before breakfast  polyethylene glycol 3350 17 Gram(s) Oral daily  senna 2 Tablet(s) Oral at bedtime  sodium chloride 0.9%. 1000 milliLiter(s) (70 mL/Hr) IV Continuous <Continuous>  tamsulosin 0.8 milliGRAM(s) Oral at bedtime  tiotropium 18 MICROgram(s) Capsule 1 Capsule(s) Inhalation daily    MEDICATIONS  (PRN):  aluminum hydroxide/magnesium hydroxide/simethicone Suspension 30 milliLiter(s) Oral every 6 hours PRN Dyspepsia  glucagon  Injectable 1 milliGRAM(s) IntraMuscular once PRN Glucose LESS THAN 70 milligrams/deciliter  guaiFENesin   Syrup  (Sugar-Free) 100 milliGRAM(s) Oral every 6 hours PRN Cough      Allergies    penicillin (Rash)    Intolerances      __________________________________________________  REVIEW OF SYSTEMS:    CONSTITUTIONAL: No fever,   EYES: no acute visual disturbances  NECK: No pain or stiffness  RESPIRATORY: No cough; No shortness of breath  CARDIOVASCULAR: No chest pain, no palpitations  GASTROINTESTINAL: No pain. No nausea or vomiting; No diarrhea       Vital Signs Last 24 Hrs  T(C): 36.9 (08 May 2019 05:07), Max: 36.9 (07 May 2019 20:32)  T(F): 98.4 (08 May 2019 05:07), Max: 98.4 (07 May 2019 20:32)  HR: 74 (08 May 2019 05:07) (70 - 78)  BP: 130/78 (08 May 2019 05:07) (115/46 - 142/82)  BP(mean): --  RR: 16 (08 May 2019 05:07) (16 - 18)  SpO2: 99% (08 May 2019 05:07) (92% - 99%)    ________________________________________________  PHYSICAL EXAM:  GENERAL: NAD,   HEAD:  , Normocephalic  EYES:  conjunctiva and sclera clear  NECK: Supple, No JVD    NERVOUS SYSTEM:  Alert & Oriented X3,   CHEST/LUNG: Clear to auscuitation bilaterally;   HEART: S1 S2+;   ABDOMEN: Soft, Nontender, Nondistended; Bowel sounds present  EXTREMITIES: no cyanosis; no edema; no calf tenderness    _________________________________________________  LABS:    05-08    139  |  108  |  15  ----------------------------<  129<H>  3.7   |  23  |  1.63<H>    Ca    8.9      08 May 2019 07:07  Phos  2.6     05-07          CAPILLARY BLOOD GLUCOSE      POCT Blood Glucose.: 136 mg/dL (08 May 2019 07:47)  POCT Blood Glucose.: 195 mg/dL (07 May 2019 21:06)  POCT Blood Glucose.: 150 mg/dL (07 May 2019 16:19)  POCT Blood Glucose.: 187 mg/dL (07 May 2019 11:32)

## 2019-05-08 NOTE — PROGRESS NOTE ADULT - ASSESSMENT
74 year old male with dysphagia. EGD confirmed endoscopic evidence of mass. Biopsies are inconclusive.       Plan:  Repeat EGD tomorrow with repeat biopsy   NPO post midnight   I was physically present for the key portions of the evaluation and management (E/M) service provided.  The patient was personally seen and examined at bedside.          Thank you for your consultation and allowing  me to participate in the care of your patients. If you have further questions please contact me at [phone].     Stiven Espino M.D.

## 2019-05-08 NOTE — PROGRESS NOTE ADULT - PROBLEM SELECTOR PLAN 3
Slightly worsening today  Give gentle hydration  Check bladder scan  Hold diuretics Slightly worsening today  Bladder scan 530 ml, patient has slight difficulty in urination, c/w flomax for now and monitor after IVF  Hold diuretics

## 2019-05-09 VITALS
RESPIRATION RATE: 17 BRPM | OXYGEN SATURATION: 97 % | HEART RATE: 80 BPM | DIASTOLIC BLOOD PRESSURE: 62 MMHG | TEMPERATURE: 98 F | SYSTOLIC BLOOD PRESSURE: 131 MMHG

## 2019-05-09 LAB
ANION GAP SERPL CALC-SCNC: 10 MMOL/L — SIGNIFICANT CHANGE UP (ref 5–17)
APTT BLD: 33.4 SEC — SIGNIFICANT CHANGE UP (ref 27.5–36.3)
BASOPHILS # BLD AUTO: 0.04 K/UL — SIGNIFICANT CHANGE UP (ref 0–0.2)
BASOPHILS NFR BLD AUTO: 0.4 % — SIGNIFICANT CHANGE UP (ref 0–2)
BUN SERPL-MCNC: 16 MG/DL — SIGNIFICANT CHANGE UP (ref 7–18)
CALCIUM SERPL-MCNC: 8.7 MG/DL — SIGNIFICANT CHANGE UP (ref 8.4–10.5)
CHLORIDE SERPL-SCNC: 108 MMOL/L — SIGNIFICANT CHANGE UP (ref 96–108)
CO2 SERPL-SCNC: 21 MMOL/L — LOW (ref 22–31)
CREAT SERPL-MCNC: 1.5 MG/DL — HIGH (ref 0.5–1.3)
EOSINOPHIL # BLD AUTO: 0.38 K/UL — SIGNIFICANT CHANGE UP (ref 0–0.5)
EOSINOPHIL NFR BLD AUTO: 4.1 % — SIGNIFICANT CHANGE UP (ref 0–6)
GLUCOSE BLDC GLUCOMTR-MCNC: 134 MG/DL — HIGH (ref 70–99)
GLUCOSE BLDC GLUCOMTR-MCNC: 135 MG/DL — HIGH (ref 70–99)
GLUCOSE BLDC GLUCOMTR-MCNC: 175 MG/DL — HIGH (ref 70–99)
GLUCOSE SERPL-MCNC: 118 MG/DL — HIGH (ref 70–99)
HCT VFR BLD CALC: 32 % — LOW (ref 39–50)
HGB BLD-MCNC: 10.4 G/DL — LOW (ref 13–17)
IMM GRANULOCYTES NFR BLD AUTO: 0.4 % — SIGNIFICANT CHANGE UP (ref 0–1.5)
INR BLD: 1.07 RATIO — SIGNIFICANT CHANGE UP (ref 0.88–1.16)
LYMPHOCYTES # BLD AUTO: 2.24 K/UL — SIGNIFICANT CHANGE UP (ref 1–3.3)
LYMPHOCYTES # BLD AUTO: 24 % — SIGNIFICANT CHANGE UP (ref 13–44)
MCHC RBC-ENTMCNC: 30 PG — SIGNIFICANT CHANGE UP (ref 27–34)
MCHC RBC-ENTMCNC: 32.5 GM/DL — SIGNIFICANT CHANGE UP (ref 32–36)
MCV RBC AUTO: 92.2 FL — SIGNIFICANT CHANGE UP (ref 80–100)
MONOCYTES # BLD AUTO: 0.77 K/UL — SIGNIFICANT CHANGE UP (ref 0–0.9)
MONOCYTES NFR BLD AUTO: 8.2 % — SIGNIFICANT CHANGE UP (ref 2–14)
NEUTROPHILS # BLD AUTO: 5.87 K/UL — SIGNIFICANT CHANGE UP (ref 1.8–7.4)
NEUTROPHILS NFR BLD AUTO: 62.9 % — SIGNIFICANT CHANGE UP (ref 43–77)
NRBC # BLD: 0 /100 WBCS — SIGNIFICANT CHANGE UP (ref 0–0)
PLATELET # BLD AUTO: 339 K/UL — SIGNIFICANT CHANGE UP (ref 150–400)
POTASSIUM SERPL-MCNC: 3.5 MMOL/L — SIGNIFICANT CHANGE UP (ref 3.5–5.3)
POTASSIUM SERPL-SCNC: 3.5 MMOL/L — SIGNIFICANT CHANGE UP (ref 3.5–5.3)
PROTHROM AB SERPL-ACNC: 11.9 SEC — SIGNIFICANT CHANGE UP (ref 10–12.9)
RBC # BLD: 3.47 M/UL — LOW (ref 4.2–5.8)
RBC # FLD: 14 % — SIGNIFICANT CHANGE UP (ref 10.3–14.5)
SODIUM SERPL-SCNC: 139 MMOL/L — SIGNIFICANT CHANGE UP (ref 135–145)
WBC # BLD: 9.34 K/UL — SIGNIFICANT CHANGE UP (ref 3.8–10.5)
WBC # FLD AUTO: 9.34 K/UL — SIGNIFICANT CHANGE UP (ref 3.8–10.5)

## 2019-05-09 PROCEDURE — 83605 ASSAY OF LACTIC ACID: CPT

## 2019-05-09 PROCEDURE — 93005 ELECTROCARDIOGRAM TRACING: CPT

## 2019-05-09 PROCEDURE — 87449 NOS EACH ORGANISM AG IA: CPT

## 2019-05-09 PROCEDURE — 82962 GLUCOSE BLOOD TEST: CPT

## 2019-05-09 PROCEDURE — 87040 BLOOD CULTURE FOR BACTERIA: CPT

## 2019-05-09 PROCEDURE — C1889: CPT

## 2019-05-09 PROCEDURE — 82436 ASSAY OF URINE CHLORIDE: CPT

## 2019-05-09 PROCEDURE — 76775 US EXAM ABDO BACK WALL LIM: CPT

## 2019-05-09 PROCEDURE — 99285 EMERGENCY DEPT VISIT HI MDM: CPT | Mod: 25

## 2019-05-09 PROCEDURE — 71045 X-RAY EXAM CHEST 1 VIEW: CPT

## 2019-05-09 PROCEDURE — 87631 RESP VIRUS 3-5 TARGETS: CPT

## 2019-05-09 PROCEDURE — 82607 VITAMIN B-12: CPT

## 2019-05-09 PROCEDURE — 83735 ASSAY OF MAGNESIUM: CPT

## 2019-05-09 PROCEDURE — 84145 PROCALCITONIN (PCT): CPT

## 2019-05-09 PROCEDURE — 84100 ASSAY OF PHOSPHORUS: CPT

## 2019-05-09 PROCEDURE — 81001 URINALYSIS AUTO W/SCOPE: CPT

## 2019-05-09 PROCEDURE — 80061 LIPID PANEL: CPT

## 2019-05-09 PROCEDURE — 88312 SPECIAL STAINS GROUP 1: CPT | Mod: 26

## 2019-05-09 PROCEDURE — 84466 ASSAY OF TRANSFERRIN: CPT

## 2019-05-09 PROCEDURE — 85730 THROMBOPLASTIN TIME PARTIAL: CPT

## 2019-05-09 PROCEDURE — 94640 AIRWAY INHALATION TREATMENT: CPT

## 2019-05-09 PROCEDURE — 80053 COMPREHEN METABOLIC PANEL: CPT

## 2019-05-09 PROCEDURE — 84443 ASSAY THYROID STIM HORMONE: CPT

## 2019-05-09 PROCEDURE — 82378 CARCINOEMBRYONIC ANTIGEN: CPT

## 2019-05-09 PROCEDURE — 85027 COMPLETE CBC AUTOMATED: CPT

## 2019-05-09 PROCEDURE — 82570 ASSAY OF URINE CREATININE: CPT

## 2019-05-09 PROCEDURE — 83540 ASSAY OF IRON: CPT

## 2019-05-09 PROCEDURE — 82728 ASSAY OF FERRITIN: CPT

## 2019-05-09 PROCEDURE — 43239 EGD BIOPSY SINGLE/MULTIPLE: CPT

## 2019-05-09 PROCEDURE — 88312 SPECIAL STAINS GROUP 1: CPT

## 2019-05-09 PROCEDURE — 80048 BASIC METABOLIC PNL TOTAL CA: CPT

## 2019-05-09 PROCEDURE — 96366 THER/PROPH/DIAG IV INF ADDON: CPT

## 2019-05-09 PROCEDURE — 96365 THER/PROPH/DIAG IV INF INIT: CPT

## 2019-05-09 PROCEDURE — 97162 PT EVAL MOD COMPLEX 30 MIN: CPT

## 2019-05-09 PROCEDURE — 85610 PROTHROMBIN TIME: CPT

## 2019-05-09 PROCEDURE — 88305 TISSUE EXAM BY PATHOLOGIST: CPT

## 2019-05-09 PROCEDURE — 70450 CT HEAD/BRAIN W/O DYE: CPT

## 2019-05-09 PROCEDURE — 83036 HEMOGLOBIN GLYCOSYLATED A1C: CPT

## 2019-05-09 PROCEDURE — 83880 ASSAY OF NATRIURETIC PEPTIDE: CPT

## 2019-05-09 PROCEDURE — 86780 TREPONEMA PALLIDUM: CPT

## 2019-05-09 PROCEDURE — 82746 ASSAY OF FOLIC ACID SERUM: CPT

## 2019-05-09 PROCEDURE — 74177 CT ABD & PELVIS W/CONTRAST: CPT

## 2019-05-09 PROCEDURE — 71250 CT THORAX DX C-: CPT

## 2019-05-09 PROCEDURE — 96375 TX/PRO/DX INJ NEW DRUG ADDON: CPT

## 2019-05-09 PROCEDURE — 83550 IRON BINDING TEST: CPT

## 2019-05-09 PROCEDURE — 36415 COLL VENOUS BLD VENIPUNCTURE: CPT

## 2019-05-09 PROCEDURE — 82803 BLOOD GASES ANY COMBINATION: CPT

## 2019-05-09 PROCEDURE — 84300 ASSAY OF URINE SODIUM: CPT

## 2019-05-09 PROCEDURE — 74220 X-RAY XM ESOPHAGUS 1CNTRST: CPT

## 2019-05-09 PROCEDURE — 82340 ASSAY OF CALCIUM IN URINE: CPT

## 2019-05-09 PROCEDURE — 88305 TISSUE EXAM BY PATHOLOGIST: CPT | Mod: 26

## 2019-05-09 RX ORDER — SODIUM CHLORIDE 9 MG/ML
1000 INJECTION INTRAMUSCULAR; INTRAVENOUS; SUBCUTANEOUS
Refills: 0 | Status: DISCONTINUED | OUTPATIENT
Start: 2019-05-09 | End: 2019-05-09

## 2019-05-09 RX ORDER — AMLODIPINE BESYLATE 2.5 MG/1
10 TABLET ORAL DAILY
Refills: 0 | Status: DISCONTINUED | OUTPATIENT
Start: 2019-05-09 | End: 2019-05-09

## 2019-05-09 RX ADMIN — PANTOPRAZOLE SODIUM 40 MILLIGRAM(S): 20 TABLET, DELAYED RELEASE ORAL at 06:41

## 2019-05-09 RX ADMIN — Medication 75 MICROGRAM(S): at 06:41

## 2019-05-09 RX ADMIN — TIOTROPIUM BROMIDE 1 CAPSULE(S): 18 CAPSULE ORAL; RESPIRATORY (INHALATION) at 12:06

## 2019-05-09 RX ADMIN — AMLODIPINE BESYLATE 10 MILLIGRAM(S): 2.5 TABLET ORAL at 07:01

## 2019-05-09 RX ADMIN — ALBUTEROL 1 PUFF(S): 90 AEROSOL, METERED ORAL at 06:39

## 2019-05-09 RX ADMIN — Medication 25 MILLIGRAM(S): at 06:41

## 2019-05-09 RX ADMIN — ALBUTEROL 1 PUFF(S): 90 AEROSOL, METERED ORAL at 13:11

## 2019-05-09 NOTE — PROGRESS NOTE ADULT - PROBLEM SELECTOR PLAN 3
Slightly improving  today  Bladder scan 530 ml, patient has no voiding dififculty, c/w flomax for now and monitor after IVF  Hold diuretics

## 2019-05-09 NOTE — CHART NOTE - NSCHARTNOTEFT_GEN_A_CORE
Esophagogastroduodenoscopy Report  Indication:  Esophageal mass. Inconclusive biopsies   Referring MD: Dr. Garcia   Instrument:  #[id number]  Anesthesia: MAC  Consent:  Informed consent was obtained from the patient after providing any opportunity for questions  Procedure: The gastroscope was gently passed through the incisoral orifice into the oral cavity and under direct visualization the esophagus was intubated. The endoscope was passed down the esophagus, through the stomach and into the 3rd portion. Color, texture, mucosa and anatomy of the esophagus, stomach, and duodenum were carefully examined with the scope. The patient tolerated the procedure well. After completion of the examination, the patient was transferred to the recovery room.   Preparation: NPO   Findings:   Oropharynx	Normal  Esophagus	Again noted a large ulcerated mass extending the length of the esophagus. Multiple deep biopsies taken along the length of the mass. Oozing of heme post one these biopsies three hemoclips deployed and 3 ml of EPI (1:10,000) injected.   EG-junction	AS above   Cardia	Normal.  Body	Normal.  Antrum	Normal.  Pylorus	Normal.  Duodenal Bulb	Normal.  2nd portion	Normal.  3rd portion	Normal.  Date and time:5/9/2019 3:42:07 PM  EBL:0  Impression: 1- Esophageal mass 2-S.p Repeat biopsies        Plan: 1- Resume diet and medications 2- Aspiration precautions 3- Follow up pathology 4- Follow up oncology     Procedure Start Time: 15:33    Procedure End Time: 15:43     Attending:       Stiven Ceron M.D.   Date and Time: 5/9/2019 3:42:07 PM

## 2019-05-09 NOTE — PROGRESS NOTE ADULT - REASON FOR ADMISSION
Sepsis and dehydration

## 2019-05-09 NOTE — PROGRESS NOTE ADULT - SUBJECTIVE AND OBJECTIVE BOX
Patient is a 74y old  Male who presents with a chief complaint of Sepsis and dehydration (08 May 2019 13:51)      INTERVAL HPI/OVERNIGHT EVENTS: no new complaints    MEDICATIONS  (STANDING):  ALBUTerol    90 MICROgram(s) HFA Inhaler 1 Puff(s) Inhalation every 4 hours  amLODIPine   Tablet 10 milliGRAM(s) Oral daily  artificial  tears Solution 1 Drop(s) Both EYES daily  atorvastatin 40 milliGRAM(s) Oral at bedtime  dextrose 5%. 1000 milliLiter(s) (50 mL/Hr) IV Continuous <Continuous>  folic acid 1 milliGRAM(s) Oral daily  heparin  Injectable 5000 Unit(s) SubCutaneous every 12 hours  insulin lispro (HumaLOG) corrective regimen sliding scale   SubCutaneous three times a day before meals  levothyroxine 75 MICROGram(s) Oral daily  metoprolol tartrate 25 milliGRAM(s) Oral two times a day  OLANZapine 5 milliGRAM(s) Oral daily  pantoprazole    Tablet 40 milliGRAM(s) Oral before breakfast  polyethylene glycol 3350 17 Gram(s) Oral daily  senna 2 Tablet(s) Oral at bedtime  sodium chloride 0.9%. 1000 milliLiter(s) (70 mL/Hr) IV Continuous <Continuous>  tamsulosin 0.8 milliGRAM(s) Oral at bedtime  tiotropium 18 MICROgram(s) Capsule 1 Capsule(s) Inhalation daily    MEDICATIONS  (PRN):  aluminum hydroxide/magnesium hydroxide/simethicone Suspension 30 milliLiter(s) Oral every 6 hours PRN Dyspepsia  glucagon  Injectable 1 milliGRAM(s) IntraMuscular once PRN Glucose LESS THAN 70 milligrams/deciliter  guaiFENesin   Syrup  (Sugar-Free) 100 milliGRAM(s) Oral every 6 hours PRN Cough      Allergies    penicillin (Rash)    Intolerances      __________________________________________________  REVIEW OF SYSTEMS:    CONSTITUTIONAL: No fever,   EYES: no acute visual disturbances  NECK: No pain or stiffness  RESPIRATORY: No cough; No shortness of breath  CARDIOVASCULAR: No chest pain, no palpitations  GASTROINTESTINAL: No pain. No nausea or vomiting; No diarrhea     Vital Signs Last 24 Hrs  T(C): 36.8 (09 May 2019 05:05), Max: 36.8 (09 May 2019 05:05)  T(F): 98.2 (09 May 2019 05:05), Max: 98.2 (09 May 2019 05:05)  HR: 73 (09 May 2019 05:05) (71 - 84)  BP: 155/75 (09 May 2019 05:05) (118/68 - 155/75)  BP(mean): --  RR: 17 (09 May 2019 05:05) (17 - 18)  SpO2: 99% (09 May 2019 05:05) (96% - 99%)    ________________________________________________  PHYSICAL EXAM:  GENERAL: NAD,   HEAD:  , Normocephalic  EYES:  conjunctiva and sclera clear  NECK: Supple, No JVD    NERVOUS SYSTEM:  Alert & Oriented X3,   CHEST/LUNG: Clear to auscuitation bilaterally;   HEART: S1 S2+;   ABDOMEN: Soft, Nontender, Nondistended; Bowel sounds present  EXTREMITIES: no cyanosis; no edema; no calf tenderness    _________________________________________________  LABS:                        10.4   9.34  )-----------( 339      ( 09 May 2019 07:19 )             32.0     05-09    139  |  108  |  16  ----------------------------<  118<H>  3.5   |  21<L>  |  1.50<H>    Ca    8.7      09 May 2019 07:19      PT/INR - ( 09 May 2019 07:19 )   PT: 11.9 sec;   INR: 1.07 ratio         PTT - ( 09 May 2019 07:19 )  PTT:33.4 sec    CAPILLARY BLOOD GLUCOSE      POCT Blood Glucose.: 134 mg/dL (09 May 2019 07:55)  POCT Blood Glucose.: 154 mg/dL (08 May 2019 21:18)  POCT Blood Glucose.: 201 mg/dL (08 May 2019 16:39)  POCT Blood Glucose.: 156 mg/dL (08 May 2019 11:44)

## 2019-05-09 NOTE — DISCHARGE NOTE NURSING/CASE MANAGEMENT/SOCIAL WORK - NSDCDPATPORTLINK_GEN_ALL_CORE
You can access the [facility] Patient Portal, offered by [facility], by registering with the following website: [email or web address]

## 2019-05-09 NOTE — PROGRESS NOTE ADULT - PROBLEM SELECTOR PLAN 1
Pathology with metaplasia, but slightly inconclusive, recommended repeat biopsy  Going for EGD today  DC plan to [facility] after EGD

## 2019-12-27 NOTE — SWALLOW BEDSIDE ASSESSMENT ADULT - MUCOSAL QUALITY
Hi,    Your diabetes screen was normal.  Your hemoglobin is slightly low so I recommend you increase the iron in your diet.  Please let me know if you have questions.      Thanks,  Dr. Munoz WNL

## 2020-01-04 NOTE — ED ADULT NURSE NOTE - TEMPLATE
Suzanna from [facility] calling and needs lab orders extended for lipid and sgpt. Pt is fasting.    Labs extended   Abdominal Pain, N/V/D

## 2021-05-26 NOTE — DISCHARGE NOTE NURSING/CASE MANAGEMENT/SOCIAL WORK - NSDCPEPTCAREGIVEDUMATLIST _GEN_ALL_CORE
Patient called and requested his prescription from yesterday be changed to quantity of 90 and not 30, insurance will only cover 90   Thank you Diabetes

## 2021-08-24 NOTE — PROGRESS NOTE ADULT - PROBLEM SELECTOR PLAN 9
Patient notified   [] Previous VTE                                                3  [] Thrombophilia                                             2  [] Lower limb paralysis                                   2    [] Current Cancer                                             2   [X] Immobilization > 24 hrs                              1  [X] ICU/CCU stay > 24 hours                             1  [X] Age > 60                                                         1    IMPROVE VTE Score: 3  heparin

## 2022-03-30 NOTE — PROGRESS NOTE ADULT - ATTENDING COMMENTS
----- Message from Desiree Razo sent at 3/30/2022 11:51 AM CDT -----  Needs advice from nurse:      Who Called:pt  Regarding:needs refill on dulaglutide (TRULICITY) 1.5 mg/0.5 mL pen injector  Inject 1.5 mg into the skin once a week.,    Also, needs refill on all other prescriptions as well.     Would the patient rather a call back or [facility]IA [facility]?  Best Call Back number:[phone]  Additional Info:[facility] - [place], [place] - [address] [place] [address] (Ph: [phone])or        Patient was seen and examined by myself. Case was discussed with house staff in details. I have reviewed and agree with the plan as outlined above with edits where appropriate.    Vital Signs Last 24 Hrs  T(C): 36.6 (08 May 2019 14:00), Max: 36.9 (07 May 2019 20:32)  T(F): 97.8 (08 May 2019 14:00), Max: 98.4 (07 May 2019 20:32)  HR: 79 (08 May 2019 14:00) (70 - 79)  BP: 147/52 (08 May 2019 14:00) (115/46 - 147/52)  BP(mean): --  RR: 18 (08 May 2019 14:00) (16 - 18)  SpO2: 98% (08 May 2019 14:00) (98% - 99%)      A/P: 75 y/o M with   1. 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 Acute renal failure possibly ATN  3. Disseminated disease with lymphadenopathy likely disseminated cancer  4. Hypokalemia  5. Essential HTN  6. Urinary retention- resolved  7. Metabolic encephalopathy- resolving  8. Lactic acidosis presumed due to dehydration  9. dysphagia likely due to esophageal abnormality   10 Leukocytosis      Pathology report reviewed- metaplasia noted; rebiopsy recommended.  Discussed with GI attending- plan for repeat EGD and biopsy in am.  Anticipate discharge to [facility] after.  other plan as in above note

## 2022-08-21 NOTE — PROGRESS NOTE ADULT - PROBLEM SELECTOR PROBLEM 1
Group Topic: BH Process Group     Date: 8/20/2022  Start Time: 1815  End Time: 1900  Facilitators: Linnea Johnson    Focus: Communication skills  Number in attendance: 6      Method: Group  Attendance: Present  Participation: Moderate  Patient Response: Easily frustrated  Mood: Irritable  Affect: Type: Anxious   Range: Blunted/flat   Congruency: Incongruent   Stability: Labile  Behavior/Socialization: Indifferent  Thought Process: Easily distracted  Task Performance: Needs clarification  Patient Evaluation: Encouragement - needs prompts         Sepsis

## 2023-08-25 NOTE — PROGRESS NOTE ADULT - PROBLEM SELECTOR PROBLEM 5
Problem: Rehabilitation ([facility]) Plan of Care  Goal: Plan of Care Review  Outcome: Ongoing, Progressing  Flowsheets (Taken 8/25/2023 1126)  Progress: improving  Plan of Care Reviewed With: patient  Outcome Evaluation:   Bed and chair alarm noted   Call light and items within reach   progressing with current therapies. No acute signs of distress noted. Continue current plan of care.  Goal: Patient-Specific Goal (Individualized)  Outcome: Ongoing, Progressing  Goal: Absence of New-Onset Illness or Injury  Outcome: Ongoing, Progressing  Intervention: Prevent Fall and Fall Injury  Recent Flowsheet Documentation  Taken 8/25/2023 0939 by Vicky Phillips RN  Safety Promotion/Fall Prevention:   safety round/check completed   assistive device/personal items within reach   nonskid shoes/slippers when out of bed  Intervention: Prevent VTE (Venous Thromboembolism)  Recent Flowsheet Documentation  Taken 8/25/2023 0939 by Vicky Phillips, RN  VTE Prevention/Management: (Heparin) other (see comments)  Goal: Optimal Comfort and Wellbeing  Outcome: Ongoing, Progressing  Goal: Home and Community Transition Plan Established  Outcome: Ongoing, Progressing   Goal Outcome Evaluation:  Plan of Care Reviewed With: patient        Progress: improving  Outcome Evaluation: Bed and chair alarm noted; Call light and items within reach; progressing with current therapies. No acute signs of distress noted. Continue current plan of care.          History of hypertension

## 2023-12-27 NOTE — CONSULT NOTE ADULT - CONSTITUTIONAL DETAILS
Patient to the ER c/o n/v. Patient from [facility], [facility]. SNF states patient vomited up his dinner, diarrhea today. No fever     well-nourished/well-developed/well-groomed/no distress

## 2024-08-06 NOTE — PATIENT PROFILE ADULT - NSPROPTRIGHTREPNAME_GEN_A__NUR
----- Message from Marcella Baker sent at 8/2/2024  3:53 PM EDT -----  Regarding: ECC Escalation To Practice  ECC Escalation To Practice      Type of Escalation: Red Flag Symptom  --------------------------------------------------------------------------------------------------------------------------    Information for Provider:  Patient is looking for appointment for: Symptom PT don't know what to do he check his sugar level and his Glucose 315.  Reasons for Message: Patient disconnected     Additional Information : PT disconnected the call we are trying to contact the practice for 10min then the practice drop the call 2 times.  --------------------------------------------------------------------------------------------------------------------------    Relationship to Patient: Self     Call Back Info: OK to leave message on voicemail  Preferred Call Back Number: Phone :[phone] (home) [phone] (work)       Lay Ramon

## 2025-06-02 NOTE — ED PROVIDER NOTE - NSTIMEPROVIDERCAREINITIATE_GEN_ER
Complexity (Necessary For Coding; Major - 90 Day Global With Some Exceptions; Minor - 10 Day Global): major Identified Risk Factors Documented?: yes Risk Assessment Explanation (Does Not Render In The Note): Clinical determination of the probability and/or consequences of an event, such as surgery. Clinical assessment of the level of risk is affected by the nature of the event under consideration for the patient. Modifier 57 is used to indicate an Evaluation and Management (E/M) service resulted in the initial decision to perform surgery either the day before a major surgery (90 day global) or the day of a major surgery. Discussion: We discussed the risks and benefits of dermatologic surgery, any location specific risk factors, as well as the potential adverse events associated with wound repair/suture placement, what to expect if a graft or flap was required, and the need for attention to wound care postoperatively. Initial Decision For Surgery?: No 29-Apr-2019 12:11

## 2025-06-19 NOTE — PROGRESS NOTE ADULT - PROBLEM/PLAN-5
Paged by EMS to report picking pt up but unable to get in touch with EMS via hospital  after multiple attempts.  Spoke with wife.  Home health called EMS because pt BP was lower and he has been falling, but no alarms.     1800: Paged by ER MD, Dr. Simpson. Report received on pt and discussed transfer to [facility].  He is calling transfer center, just notifying VAD coordinator of POC.  Dr. Garcia notified.    DISPLAY PLAN FREE TEXT